# Patient Record
Sex: MALE | Race: WHITE | NOT HISPANIC OR LATINO
[De-identification: names, ages, dates, MRNs, and addresses within clinical notes are randomized per-mention and may not be internally consistent; named-entity substitution may affect disease eponyms.]

---

## 2018-12-11 ENCOUNTER — APPOINTMENT (OUTPATIENT)
Dept: FAMILY MEDICINE | Facility: CLINIC | Age: 48
End: 2018-12-11

## 2018-12-20 ENCOUNTER — APPOINTMENT (OUTPATIENT)
Dept: FAMILY MEDICINE | Facility: CLINIC | Age: 48
End: 2018-12-20
Payer: MEDICAID

## 2018-12-20 VITALS
BODY MASS INDEX: 27.32 KG/M2 | SYSTOLIC BLOOD PRESSURE: 140 MMHG | HEIGHT: 66 IN | WEIGHT: 170 LBS | DIASTOLIC BLOOD PRESSURE: 90 MMHG

## 2018-12-20 DIAGNOSIS — K21.9 GASTRO-ESOPHAGEAL REFLUX DISEASE W/OUT ESOPHAGITIS: ICD-10-CM

## 2018-12-20 DIAGNOSIS — F17.200 NICOTINE DEPENDENCE, UNSPECIFIED, UNCOMPLICATED: ICD-10-CM

## 2018-12-20 DIAGNOSIS — M16.10 UNILATERAL PRIMARY OSTEOARTHRITIS, UNSPECIFIED HIP: ICD-10-CM

## 2018-12-20 PROCEDURE — 99203 OFFICE O/P NEW LOW 30 MIN: CPT

## 2019-01-18 ENCOUNTER — RX RENEWAL (OUTPATIENT)
Age: 49
End: 2019-01-18

## 2019-02-04 ENCOUNTER — APPOINTMENT (OUTPATIENT)
Dept: FAMILY MEDICINE | Facility: CLINIC | Age: 49
End: 2019-02-04
Payer: MEDICAID

## 2019-02-04 VITALS — SYSTOLIC BLOOD PRESSURE: 146 MMHG | TEMPERATURE: 99 F | DIASTOLIC BLOOD PRESSURE: 100 MMHG

## 2019-02-04 PROCEDURE — 99214 OFFICE O/P EST MOD 30 MIN: CPT

## 2019-02-04 NOTE — REVIEW OF SYSTEMS
[Nasal Discharge] : nasal discharge [Sore Throat] : sore throat [Cough] : cough [Negative] : Cardiovascular

## 2019-02-04 NOTE — HISTORY OF PRESENT ILLNESS
[FreeTextEntry8] : c/o sore throat, chest and sinus congestion, yellow mucous\par smokes about 1/2 ppd\par c/o rt upper gum pain, dx'd w/ deviated palate, complication from previous implant

## 2019-02-04 NOTE — PHYSICAL EXAM
[No Acute Distress] : no acute distress [Well Nourished] : well nourished [Normal Sclera/Conjunctiva] : normal sclera/conjunctiva [EOMI] : extraocular movements intact [No JVD] : no jugular venous distention [Supple] : supple [No Lymphadenopathy] : no lymphadenopathy [No Respiratory Distress] : no respiratory distress  [Clear to Auscultation] : lungs were clear to auscultation bilaterally [No Accessory Muscle Use] : no accessory muscle use [Normal Rate] : normal rate  [Regular Rhythm] : with a regular rhythm [Normal S1, S2] : normal S1 and S2 [Normal Supraclavicular Nodes] : no supraclavicular lymphadenopathy [Normal Posterior Cervical Nodes] : no posterior cervical lymphadenopathy [Normal Anterior Cervical Nodes] : no anterior cervical lymphadenopathy [de-identified] : poor dentition, deviated palate, injected nasal turbinates, bulging tms [de-identified] : ambulates w/ cane

## 2019-02-04 NOTE — COUNSELING
[Good understanding] : Patient has a good understanding of lifestyle changes and the steps needed to achieve self management goals [Smoking cessation counseling provided] : smoking cessation [Low Salt Diet] : Low salt diet

## 2019-03-02 ENCOUNTER — INPATIENT (INPATIENT)
Facility: HOSPITAL | Age: 49
LOS: 5 days | Discharge: HOME | End: 2019-03-08
Attending: INTERNAL MEDICINE | Admitting: INTERNAL MEDICINE
Payer: MEDICAID

## 2019-03-02 VITALS — RESPIRATION RATE: 30 BRPM | OXYGEN SATURATION: 99 % | HEART RATE: 120 BPM

## 2019-03-02 DIAGNOSIS — I10 ESSENTIAL (PRIMARY) HYPERTENSION: ICD-10-CM

## 2019-03-02 DIAGNOSIS — R56.9 UNSPECIFIED CONVULSIONS: ICD-10-CM

## 2019-03-02 DIAGNOSIS — F10.10 ALCOHOL ABUSE, UNCOMPLICATED: ICD-10-CM

## 2019-03-02 DIAGNOSIS — S79.102A: Chronic | ICD-10-CM

## 2019-03-02 LAB
ALBUMIN SERPL ELPH-MCNC: 3.5 G/DL — SIGNIFICANT CHANGE UP (ref 3.3–5)
ALP SERPL-CCNC: 57 U/L — SIGNIFICANT CHANGE UP (ref 40–120)
ALT FLD-CCNC: 40 U/L — SIGNIFICANT CHANGE UP (ref 12–78)
AMPHET UR-MCNC: NEGATIVE — SIGNIFICANT CHANGE UP
ANION GAP SERPL CALC-SCNC: 15 MMOL/L — SIGNIFICANT CHANGE UP (ref 5–17)
APAP SERPL-MCNC: <2 UG/ML — LOW (ref 10–30)
AST SERPL-CCNC: 44 U/L — HIGH (ref 15–37)
BARBITURATES UR SCN-MCNC: NEGATIVE — SIGNIFICANT CHANGE UP
BASE EXCESS BLDA CALC-SCNC: -1 MMOL/L — SIGNIFICANT CHANGE UP (ref -2–2)
BASE EXCESS BLDA CALC-SCNC: -5.3 MMOL/L — LOW (ref -2–2)
BASOPHILS # BLD AUTO: 0.11 K/UL — SIGNIFICANT CHANGE UP (ref 0–0.2)
BASOPHILS NFR BLD AUTO: 0.7 % — SIGNIFICANT CHANGE UP (ref 0–2)
BENZODIAZ UR-MCNC: POSITIVE — SIGNIFICANT CHANGE UP
BILIRUB SERPL-MCNC: 0.2 MG/DL — SIGNIFICANT CHANGE UP (ref 0.2–1.2)
BLOOD GAS COMMENTS: SIGNIFICANT CHANGE UP
BLOOD GAS SOURCE: SIGNIFICANT CHANGE UP
BLOOD GAS SOURCE: SIGNIFICANT CHANGE UP
BUN SERPL-MCNC: 10 MG/DL — SIGNIFICANT CHANGE UP (ref 7–23)
CALCIUM SERPL-MCNC: 8.2 MG/DL — LOW (ref 8.5–10.1)
CHLORIDE SERPL-SCNC: 100 MMOL/L — SIGNIFICANT CHANGE UP (ref 96–108)
CK MB BLD-MCNC: 1.6 % — SIGNIFICANT CHANGE UP (ref 0–3.5)
CK MB CFR SERPL CALC: 4.7 NG/ML — HIGH (ref 0.5–3.6)
CK SERPL-CCNC: 299 U/L — SIGNIFICANT CHANGE UP (ref 26–308)
CO2 SERPL-SCNC: 21 MMOL/L — LOW (ref 22–31)
COCAINE METAB.OTHER UR-MCNC: NEGATIVE — SIGNIFICANT CHANGE UP
CREAT SERPL-MCNC: 0.78 MG/DL — SIGNIFICANT CHANGE UP (ref 0.5–1.3)
EOSINOPHIL # BLD AUTO: 0.12 K/UL — SIGNIFICANT CHANGE UP (ref 0–0.5)
EOSINOPHIL NFR BLD AUTO: 0.7 % — SIGNIFICANT CHANGE UP (ref 0–6)
ETHANOL SERPL-MCNC: 10 MG/DL — SIGNIFICANT CHANGE UP (ref 0–10)
GLUCOSE SERPL-MCNC: 109 MG/DL — HIGH (ref 70–99)
HCO3 BLDA-SCNC: 20 MMOL/L — LOW (ref 21–29)
HCO3 BLDA-SCNC: 24 MMOL/L — SIGNIFICANT CHANGE UP (ref 21–29)
HCT VFR BLD CALC: 42.4 % — SIGNIFICANT CHANGE UP (ref 39–50)
HGB BLD-MCNC: 13.5 G/DL — SIGNIFICANT CHANGE UP (ref 13–17)
HOROWITZ INDEX BLDA+IHG-RTO: 100 — SIGNIFICANT CHANGE UP
HOROWITZ INDEX BLDA+IHG-RTO: 50 — SIGNIFICANT CHANGE UP
IMM GRANULOCYTES NFR BLD AUTO: 1.1 % — SIGNIFICANT CHANGE UP (ref 0–1.5)
LYMPHOCYTES # BLD AUTO: 19.6 % — SIGNIFICANT CHANGE UP (ref 13–44)
LYMPHOCYTES # BLD AUTO: 3.29 K/UL — SIGNIFICANT CHANGE UP (ref 1–3.3)
MCHC RBC-ENTMCNC: 29.3 PG — SIGNIFICANT CHANGE UP (ref 27–34)
MCHC RBC-ENTMCNC: 31.8 GM/DL — LOW (ref 32–36)
MCV RBC AUTO: 92.2 FL — SIGNIFICANT CHANGE UP (ref 80–100)
METHADONE UR-MCNC: NEGATIVE — SIGNIFICANT CHANGE UP
MONOCYTES # BLD AUTO: 0.92 K/UL — HIGH (ref 0–0.9)
MONOCYTES NFR BLD AUTO: 5.5 % — SIGNIFICANT CHANGE UP (ref 2–14)
NEUTROPHILS # BLD AUTO: 12.16 K/UL — HIGH (ref 1.8–7.4)
NEUTROPHILS NFR BLD AUTO: 72.4 % — SIGNIFICANT CHANGE UP (ref 43–77)
NRBC # BLD: 0 /100 WBCS — SIGNIFICANT CHANGE UP (ref 0–0)
OPIATES UR-MCNC: POSITIVE — SIGNIFICANT CHANGE UP
PCO2 BLDA: 42 MMHG — SIGNIFICANT CHANGE UP (ref 32–46)
PCO2 BLDA: 43 MMHG — SIGNIFICANT CHANGE UP (ref 32–46)
PCP SPEC-MCNC: SIGNIFICANT CHANGE UP
PCP UR-MCNC: NEGATIVE — SIGNIFICANT CHANGE UP
PH BLD: 7.3 — LOW (ref 7.35–7.45)
PH BLD: 7.37 — SIGNIFICANT CHANGE UP (ref 7.35–7.45)
PLATELET # BLD AUTO: 392 K/UL — SIGNIFICANT CHANGE UP (ref 150–400)
PO2 BLDA: 140 MMHG — HIGH (ref 74–108)
PO2 BLDA: 502 MMHG — HIGH (ref 74–108)
POTASSIUM SERPL-MCNC: 3.5 MMOL/L — SIGNIFICANT CHANGE UP (ref 3.5–5.3)
POTASSIUM SERPL-SCNC: 3.5 MMOL/L — SIGNIFICANT CHANGE UP (ref 3.5–5.3)
PROT SERPL-MCNC: 7.4 GM/DL — SIGNIFICANT CHANGE UP (ref 6–8.3)
RBC # BLD: 4.6 M/UL — SIGNIFICANT CHANGE UP (ref 4.2–5.8)
RBC # FLD: 13.5 % — SIGNIFICANT CHANGE UP (ref 10.3–14.5)
SALICYLATES SERPL-MCNC: 3.8 MG/DL — SIGNIFICANT CHANGE UP (ref 2.8–20)
SAO2 % BLDA: 100 % — HIGH (ref 92–96)
SAO2 % BLDA: 99 % — HIGH (ref 92–96)
SODIUM SERPL-SCNC: 136 MMOL/L — SIGNIFICANT CHANGE UP (ref 135–145)
THC UR QL: NEGATIVE — SIGNIFICANT CHANGE UP
TROPONIN I SERPL-MCNC: <.015 NG/ML — SIGNIFICANT CHANGE UP (ref 0.01–0.04)
WBC # BLD: 16.78 K/UL — HIGH (ref 3.8–10.5)
WBC # FLD AUTO: 16.78 K/UL — HIGH (ref 3.8–10.5)

## 2019-03-02 PROCEDURE — 99291 CRITICAL CARE FIRST HOUR: CPT

## 2019-03-02 PROCEDURE — 71045 X-RAY EXAM CHEST 1 VIEW: CPT | Mod: 26

## 2019-03-02 PROCEDURE — 70450 CT HEAD/BRAIN W/O DYE: CPT | Mod: 26

## 2019-03-02 PROCEDURE — 71045 X-RAY EXAM CHEST 1 VIEW: CPT | Mod: 26,77

## 2019-03-02 RX ORDER — MIDAZOLAM HYDROCHLORIDE 1 MG/ML
0.05 INJECTION, SOLUTION INTRAMUSCULAR; INTRAVENOUS
Qty: 1250 | Refills: 0 | Status: DISCONTINUED | OUTPATIENT
Start: 2019-03-02 | End: 2019-03-02

## 2019-03-02 RX ORDER — NALOXONE HYDROCHLORIDE 4 MG/.1ML
0.4 SPRAY NASAL ONCE
Qty: 0 | Refills: 0 | Status: COMPLETED | OUTPATIENT
Start: 2019-03-02 | End: 2019-03-02

## 2019-03-02 RX ORDER — MIDAZOLAM HYDROCHLORIDE 1 MG/ML
2 INJECTION, SOLUTION INTRAMUSCULAR; INTRAVENOUS
Qty: 0 | Refills: 0 | Status: DISCONTINUED | OUTPATIENT
Start: 2019-03-02 | End: 2019-03-03

## 2019-03-02 RX ORDER — THIAMINE MONONITRATE (VIT B1) 100 MG
100 TABLET ORAL ONCE
Qty: 0 | Refills: 0 | Status: COMPLETED | OUTPATIENT
Start: 2019-03-02 | End: 2019-03-02

## 2019-03-02 RX ORDER — POTASSIUM CHLORIDE 20 MEQ
20 PACKET (EA) ORAL ONCE
Qty: 0 | Refills: 0 | Status: COMPLETED | OUTPATIENT
Start: 2019-03-02 | End: 2019-03-02

## 2019-03-02 RX ORDER — DEXMEDETOMIDINE HYDROCHLORIDE IN 0.9% SODIUM CHLORIDE 4 UG/ML
0.4 INJECTION INTRAVENOUS
Qty: 200 | Refills: 0 | Status: DISCONTINUED | OUTPATIENT
Start: 2019-03-02 | End: 2019-03-04

## 2019-03-02 RX ORDER — NICOTINE POLACRILEX 2 MG
1 GUM BUCCAL DAILY
Qty: 0 | Refills: 0 | Status: DISCONTINUED | OUTPATIENT
Start: 2019-03-02 | End: 2019-03-08

## 2019-03-02 RX ORDER — MIDAZOLAM HYDROCHLORIDE 1 MG/ML
4 INJECTION, SOLUTION INTRAMUSCULAR; INTRAVENOUS ONCE
Qty: 0 | Refills: 0 | Status: DISCONTINUED | OUTPATIENT
Start: 2019-03-02 | End: 2019-03-02

## 2019-03-02 RX ORDER — PHENOBARBITAL 60 MG
65 TABLET ORAL EVERY 6 HOURS
Qty: 0 | Refills: 0 | Status: DISCONTINUED | OUTPATIENT
Start: 2019-03-02 | End: 2019-03-03

## 2019-03-02 RX ORDER — PIPERACILLIN AND TAZOBACTAM 4; .5 G/20ML; G/20ML
3.38 INJECTION, POWDER, LYOPHILIZED, FOR SOLUTION INTRAVENOUS ONCE
Qty: 0 | Refills: 0 | Status: COMPLETED | OUTPATIENT
Start: 2019-03-02 | End: 2019-03-02

## 2019-03-02 RX ORDER — PHENOBARBITAL 60 MG
130 TABLET ORAL ONCE
Qty: 0 | Refills: 0 | Status: DISCONTINUED | OUTPATIENT
Start: 2019-03-02 | End: 2019-03-02

## 2019-03-02 RX ORDER — PROPOFOL 10 MG/ML
20 INJECTION, EMULSION INTRAVENOUS
Qty: 1000 | Refills: 0 | Status: DISCONTINUED | OUTPATIENT
Start: 2019-03-02 | End: 2019-03-02

## 2019-03-02 RX ORDER — LEVETIRACETAM 250 MG/1
1000 TABLET, FILM COATED ORAL ONCE
Qty: 0 | Refills: 0 | Status: COMPLETED | OUTPATIENT
Start: 2019-03-02 | End: 2019-03-02

## 2019-03-02 RX ORDER — MIDAZOLAM HYDROCHLORIDE 1 MG/ML
0.05 INJECTION, SOLUTION INTRAMUSCULAR; INTRAVENOUS
Qty: 100 | Refills: 0 | Status: DISCONTINUED | OUTPATIENT
Start: 2019-03-02 | End: 2019-03-02

## 2019-03-02 RX ORDER — SODIUM CHLORIDE 9 MG/ML
1000 INJECTION INTRAMUSCULAR; INTRAVENOUS; SUBCUTANEOUS
Qty: 0 | Refills: 0 | Status: COMPLETED | OUTPATIENT
Start: 2019-03-02 | End: 2019-03-02

## 2019-03-02 RX ORDER — PANTOPRAZOLE SODIUM 20 MG/1
40 TABLET, DELAYED RELEASE ORAL DAILY
Qty: 0 | Refills: 0 | Status: DISCONTINUED | OUTPATIENT
Start: 2019-03-02 | End: 2019-03-03

## 2019-03-02 RX ORDER — ENOXAPARIN SODIUM 100 MG/ML
40 INJECTION SUBCUTANEOUS DAILY
Qty: 0 | Refills: 0 | Status: DISCONTINUED | OUTPATIENT
Start: 2019-03-02 | End: 2019-03-08

## 2019-03-02 RX ADMIN — Medication 4 MG/KG/HR: at 07:44

## 2019-03-02 RX ADMIN — Medication 4 MILLIGRAM(S): at 05:58

## 2019-03-02 RX ADMIN — Medication 130 MILLIGRAM(S): at 20:24

## 2019-03-02 RX ADMIN — Medication 65 MILLIGRAM(S): at 22:40

## 2019-03-02 RX ADMIN — PROPOFOL 9.6 MICROGRAM(S)/KG/MIN: 10 INJECTION, EMULSION INTRAVENOUS at 05:09

## 2019-03-02 RX ADMIN — SODIUM CHLORIDE 1000 MILLILITER(S): 9 INJECTION INTRAMUSCULAR; INTRAVENOUS; SUBCUTANEOUS at 06:04

## 2019-03-02 RX ADMIN — Medication 50 MILLIGRAM(S): at 18:24

## 2019-03-02 RX ADMIN — Medication 4 MILLIGRAM(S): at 23:40

## 2019-03-02 RX ADMIN — SODIUM CHLORIDE 2000 MILLILITER(S): 9 INJECTION INTRAMUSCULAR; INTRAVENOUS; SUBCUTANEOUS at 05:42

## 2019-03-02 RX ADMIN — Medication 130 MILLIGRAM(S): at 23:15

## 2019-03-02 RX ADMIN — LEVETIRACETAM 400 MILLIGRAM(S): 250 TABLET, FILM COATED ORAL at 07:22

## 2019-03-02 RX ADMIN — SODIUM CHLORIDE 1000 MILLILITER(S): 9 INJECTION INTRAMUSCULAR; INTRAVENOUS; SUBCUTANEOUS at 06:08

## 2019-03-02 RX ADMIN — Medication 2 MILLIGRAM(S): at 04:28

## 2019-03-02 RX ADMIN — MIDAZOLAM HYDROCHLORIDE 4 MG/KG/HR: 1 INJECTION, SOLUTION INTRAMUSCULAR; INTRAVENOUS at 07:15

## 2019-03-02 RX ADMIN — SODIUM CHLORIDE 2000 MILLILITER(S): 9 INJECTION INTRAMUSCULAR; INTRAVENOUS; SUBCUTANEOUS at 04:31

## 2019-03-02 RX ADMIN — PROPOFOL 9.6 MICROGRAM(S)/KG/MIN: 10 INJECTION, EMULSION INTRAVENOUS at 12:03

## 2019-03-02 RX ADMIN — DEXMEDETOMIDINE HYDROCHLORIDE IN 0.9% SODIUM CHLORIDE 8.47 MICROGRAM(S)/KG/HR: 4 INJECTION INTRAVENOUS at 20:49

## 2019-03-02 RX ADMIN — Medication 1 PATCH: at 14:45

## 2019-03-02 RX ADMIN — Medication 20 MILLIEQUIVALENT(S): at 11:38

## 2019-03-02 RX ADMIN — Medication 100 MILLIGRAM(S): at 07:22

## 2019-03-02 RX ADMIN — Medication 1 PATCH: at 19:20

## 2019-03-02 RX ADMIN — DEXMEDETOMIDINE HYDROCHLORIDE IN 0.9% SODIUM CHLORIDE 8.47 MICROGRAM(S)/KG/HR: 4 INJECTION INTRAVENOUS at 23:02

## 2019-03-02 RX ADMIN — SODIUM CHLORIDE 2000 MILLILITER(S): 9 INJECTION INTRAMUSCULAR; INTRAVENOUS; SUBCUTANEOUS at 04:30

## 2019-03-02 RX ADMIN — SODIUM CHLORIDE 2000 MILLILITER(S): 9 INJECTION INTRAMUSCULAR; INTRAVENOUS; SUBCUTANEOUS at 06:05

## 2019-03-02 RX ADMIN — NALOXONE HYDROCHLORIDE 0.4 MILLIGRAM(S): 4 SPRAY NASAL at 04:24

## 2019-03-02 RX ADMIN — MIDAZOLAM HYDROCHLORIDE 4 MILLIGRAM(S): 1 INJECTION, SOLUTION INTRAMUSCULAR; INTRAVENOUS at 04:24

## 2019-03-02 RX ADMIN — MIDAZOLAM HYDROCHLORIDE 4 MILLIGRAM(S): 1 INJECTION, SOLUTION INTRAMUSCULAR; INTRAVENOUS at 04:35

## 2019-03-02 RX ADMIN — PIPERACILLIN AND TAZOBACTAM 200 GRAM(S): 4; .5 INJECTION, POWDER, LYOPHILIZED, FOR SOLUTION INTRAVENOUS at 08:01

## 2019-03-02 RX ADMIN — Medication 50 MILLIGRAM(S): at 11:38

## 2019-03-02 RX ADMIN — PANTOPRAZOLE SODIUM 40 MILLIGRAM(S): 20 TABLET, DELAYED RELEASE ORAL at 11:38

## 2019-03-02 RX ADMIN — ENOXAPARIN SODIUM 40 MILLIGRAM(S): 100 INJECTION SUBCUTANEOUS at 11:37

## 2019-03-02 NOTE — H&P ADULT - ASSESSMENT
49 yo male, h/o of HTN, hyperlipidemia, ETOH abuse, heroin use, found by mom unresponsive and seizing.  no prior history of seizures, unknown when last drink was.  Had 4-5 seizures in ED despite being on propofol and versed drip.  Currently no active seizure activity.

## 2019-03-02 NOTE — ED PROVIDER NOTE - PHYSICAL EXAMINATION
Gen: combative, sonorous respirations  Head: NC, AT, 4mm, reactive, EOMI, normal lids/conjunctiva  Neck: +supple, no tenderness/meningismus/JVD, +Trachea midline  Pulm: Bilateral BS, normal resp effort, no wheeze/stridor/retractions  CV: tachycardic, no M/R/G, +dist pulses  Abd: soft, NT/ND, +BS, no hepatosplenomegaly  Mskel: no edema/erythema/cyanosis  Neuro: combative, zapata x4

## 2019-03-02 NOTE — ED ADULT NURSE REASSESSMENT NOTE - NS ED NURSE REASSESS COMMENT FT1
Patient with Tonic-clonic seizure lasting about 20 seconds: started in legs and moved upward. + jaw clenching and flailing of arms and legs. Dr. Rausch to Bedside: Ativan 4mg given IVP emergently. Patient with Tonic-clonic seizure lasting about 20 seconds: started in legs and moved upward. + jaw clenching and flailing of arms and legs. Dr. Rausch to Bedside: Ativan 4mg given IVP emergently. Versed drip increased at bedside by Dr. Rausch.

## 2019-03-02 NOTE — ED PROVIDER NOTE - CLINICAL SUMMARY MEDICAL DECISION MAKING FREE TEXT BOX
patient with chronic alcohol and heroin abuse, no alcohol in system, found to have alcohol withdrawal seizures, required intubation for airway protection, controlled with IV propofol and IV ativan, admitted to ICU for further treatment.

## 2019-03-02 NOTE — H&P ADULT - ATTENDING COMMENTS
Pt seen and examined on rounds with ICU team 3/2.    48M PMH alcohol abuse and drug abuse (heroin) presents after being found unresponsive by mother seizing (tonic clonic) with bag of heroin next to patient. Mother reports pt drinks a pack of 24 beers daily. She states that recently they have been aruging over his drinking and drug abuse. Mother states pt has been "running low on money" and likely was not able to afford buying as much alcohol as he usually drinks. EMS gave narcan after which pt had another seizure. Combative in ED requiring multiple versed pushes, intubated for airway protection. admitted to ICU for seizures, respiratory failure intubated, likely alcohol withdrawal seizures.     1. NEURO  - pt likely having alcohol withdrawal seizures  - currently not actively seizing, arousable on light sedation and following simple commands  - no further seizures noted since admission  - cont with precedex to help with withdrawal as well as with sedation  - start librium for alcohol withdrawal, phenobarbital prn breakthrough agitation  - MVI. thiamine. folate    2. PULM  - weaned and extubated this afternoon  - doing well on nasal cannula    3. ID  - mild leukocytosis can be in setting of acute etoh withdrawal vs recent leukemoid reaction with seizures  - monitor off antibiotics  - monitor for fevers  - trend WBC if continues to increase check blood and urine cx    4. GEN  - supplement electrolytes as needed    Critical Care Time: 60 min

## 2019-03-02 NOTE — ED ADULT NURSE NOTE - OBJECTIVE STATEMENT
pt presents by EMS as combative s/p seizure. Pt is known heroin and alcohol abuser as per family, recently earned some money and they believe he used today. EMS had placed a 20 IV in the left hand, and gave 2mg of narcan, then pt had a seizure and was given versed. Pt is tachycardic to the 150s and hypertensive. second and third IV accesses gained in the right fa, 18g and 20 g, labs drawn and sent. Pt unable to be sedated adequately with additional meds. physician ordered etomidate and succs for intubation, which were given w adequate sedation. a total of 130 mg of propofol given as IVP for breakthrough bucking (50mg, 30 mg, 50mg.). propofol drip administered at 4mg and titrated up by Dr. Rausch. 16 fr wynn placed UA and C&S sent. Pt continuously monitored 1:1 nursing at bedside. Pt had one seizure as noted and sustained a small lac to the tip of his tongue. Pt seizure stopped and additional meds were given as per EMAR. pt brought to CT w two nurses and RT. returned to bed 4 with continous monitoring.

## 2019-03-02 NOTE — ED ADULT TRIAGE NOTE - CHIEF COMPLAINT QUOTE
As per EMS pt unresponsive, family states pt snorted heroin and had a seizure in front of them. EMS states on arrival to scene, pt found sitting up, eyes open, responds to pain. States 2mg Narcan IVP and 5mg Versed IVP given.

## 2019-03-02 NOTE — H&P ADULT - PROBLEM SELECTOR PLAN 1
load with Keppra 1 gram  continue propolol and ativan  eeg  neurology consult  thiamine, folate, MVI  tox screen

## 2019-03-02 NOTE — H&P ADULT - PMH
Essential hypertension    ETOH abuse    Heroin abuse    Pure hypercholesterolemia    Substance abuse

## 2019-03-02 NOTE — H&P ADULT - HISTORY OF PRESENT ILLNESS
HPI: · HPI Objective Statement: 48yoM; with pmh signif for Alcohol Abuse, Heroin Abuse, HTN, HLD; now p/w ams. patient found unresponsive by mother on bathroom ground next to back of heroin and then patient began having seizure. upon ems arrival, patient with snoring respirations, given narcan 2mg and began having seizure again, given versed 5mg IV.  then became combative. patient arrived persistently combative despite repeat versed doses with sonorous respirations.  intubated for airway protection.  	    Mom says patient suffers with ETOH abuse, and feels he used heroin this evening because he couldn't afford beer.  Unknown when last drink was .    In ED patient seized several more times (4-5), thru propofol and versed drip.  Currently being transitioned to ativan drip

## 2019-03-02 NOTE — ED PROVIDER NOTE - OBJECTIVE STATEMENT
48yoM; with pmh signif for Alcohol Abuse, Heroin Abuse, HTN, HLD; now p/w ams. patient found unresponsive by mother on bathroom ground next to back of heroin and then patient began having seizure. upon ems arrival, patient with snorous respirations, given narcan 2mg and began having seizure again, given versed 5mg IV.  then became combative. patient arrived persistently combative despite repeat versed doses with sonorous respirations.  intubated for airway protection.  PMH: Alcohol Abuse, Heroin Abuse, HTN, HLD  SOCIAL: +smoking, +heroin, +alcohol

## 2019-03-02 NOTE — H&P ADULT - NSHPLABSRESULTS_GEN_ALL_CORE
< from: CT Head No Cont (03.02.19 @ 06:39) >    INTERPRETATION:    Clinical Indication: Seizure. Rule out intracranial hemorrhage    Comparison: None.    Technique: Noncontrast axial CT images of the head it were acquired.    Findings: No intracranial hemorrhage is seen. Gray-white differentiation   is maintained. Ventricular and sulcal size are age-appropriate. No mass   effect or midline shift is seen. There is mucosal thickening of both   maxillary, ethmoid, sphenoid and left frontal sinuses. Small retention   cysts or polyps are seen in the right maxillary and ethmoid sinuses. The   visualized mastoid air cells are clear. No osseous abnormality is seen.    Impression: No intracranial abnormality.  Pansinusitis.

## 2019-03-02 NOTE — H&P ADULT - NSHPPHYSICALEXAM_GEN_ALL_CORE
patient intubated and sedated    pupils pinpoint but reactive  + corneals  + positive brainstem reflexes  moves all extremities purposefully to noxious stimuli    on mechanical ventilation:  l/s CTA b/l  CV: s1S2 RRR  abd soft  extemities + track marks

## 2019-03-02 NOTE — ED ADULT NURSE NOTE - ED STAT RN HANDOFF DETAILS
pt received intubated, propofol drip in progress, ativan drip started, vs stable, wynn intact, no seizure activity noted. report given to MELY cintron. ok to transport with 2 RNs and respiratory

## 2019-03-02 NOTE — H&P ADULT - NSHPSOCIALHISTORY_GEN_ALL_CORE
long history of ETOH use, although mom thought he was quitting because he got a new job.  Also uses heroin "when he can't get alcohol"

## 2019-03-03 LAB
ANION GAP SERPL CALC-SCNC: 8 MMOL/L — SIGNIFICANT CHANGE UP (ref 5–17)
BUN SERPL-MCNC: 4 MG/DL — LOW (ref 7–23)
CALCIUM SERPL-MCNC: 8.3 MG/DL — LOW (ref 8.5–10.1)
CHLORIDE SERPL-SCNC: 107 MMOL/L — SIGNIFICANT CHANGE UP (ref 96–108)
CO2 SERPL-SCNC: 24 MMOL/L — SIGNIFICANT CHANGE UP (ref 22–31)
CREAT SERPL-MCNC: 0.63 MG/DL — SIGNIFICANT CHANGE UP (ref 0.5–1.3)
GLUCOSE SERPL-MCNC: 111 MG/DL — HIGH (ref 70–99)
HCT VFR BLD CALC: 40.4 % — SIGNIFICANT CHANGE UP (ref 39–50)
HGB BLD-MCNC: 13.5 G/DL — SIGNIFICANT CHANGE UP (ref 13–17)
MAGNESIUM SERPL-MCNC: 2.2 MG/DL — SIGNIFICANT CHANGE UP (ref 1.6–2.6)
MCHC RBC-ENTMCNC: 30.1 PG — SIGNIFICANT CHANGE UP (ref 27–34)
MCHC RBC-ENTMCNC: 33.4 GM/DL — SIGNIFICANT CHANGE UP (ref 32–36)
MCV RBC AUTO: 90 FL — SIGNIFICANT CHANGE UP (ref 80–100)
NRBC # BLD: 0 /100 WBCS — SIGNIFICANT CHANGE UP (ref 0–0)
PHOSPHATE SERPL-MCNC: 2.6 MG/DL — SIGNIFICANT CHANGE UP (ref 2.5–4.5)
PLATELET # BLD AUTO: 351 K/UL — SIGNIFICANT CHANGE UP (ref 150–400)
POTASSIUM SERPL-MCNC: 3.6 MMOL/L — SIGNIFICANT CHANGE UP (ref 3.5–5.3)
POTASSIUM SERPL-SCNC: 3.6 MMOL/L — SIGNIFICANT CHANGE UP (ref 3.5–5.3)
RBC # BLD: 4.49 M/UL — SIGNIFICANT CHANGE UP (ref 4.2–5.8)
RBC # FLD: 13.6 % — SIGNIFICANT CHANGE UP (ref 10.3–14.5)
SODIUM SERPL-SCNC: 139 MMOL/L — SIGNIFICANT CHANGE UP (ref 135–145)
WBC # BLD: 15.81 K/UL — HIGH (ref 3.8–10.5)
WBC # FLD AUTO: 15.81 K/UL — HIGH (ref 3.8–10.5)

## 2019-03-03 PROCEDURE — 99233 SBSQ HOSP IP/OBS HIGH 50: CPT

## 2019-03-03 RX ORDER — PHENOBARBITAL 60 MG
130 TABLET ORAL ONCE
Qty: 0 | Refills: 0 | Status: DISCONTINUED | OUTPATIENT
Start: 2019-03-03 | End: 2019-03-03

## 2019-03-03 RX ORDER — HYDRALAZINE HCL 50 MG
10 TABLET ORAL ONCE
Qty: 0 | Refills: 0 | Status: COMPLETED | OUTPATIENT
Start: 2019-03-03 | End: 2019-03-03

## 2019-03-03 RX ORDER — OLANZAPINE 15 MG/1
5 TABLET, FILM COATED ORAL ONCE
Qty: 0 | Refills: 0 | Status: COMPLETED | OUTPATIENT
Start: 2019-03-03 | End: 2019-03-03

## 2019-03-03 RX ORDER — ACETAMINOPHEN 500 MG
650 TABLET ORAL EVERY 6 HOURS
Qty: 0 | Refills: 0 | Status: DISCONTINUED | OUTPATIENT
Start: 2019-03-03 | End: 2019-03-06

## 2019-03-03 RX ORDER — PHENOBARBITAL 60 MG
260 TABLET ORAL
Qty: 0 | Refills: 0 | Status: DISCONTINUED | OUTPATIENT
Start: 2019-03-03 | End: 2019-03-05

## 2019-03-03 RX ORDER — PHENOBARBITAL 60 MG
65 TABLET ORAL EVERY 6 HOURS
Qty: 0 | Refills: 0 | Status: DISCONTINUED | OUTPATIENT
Start: 2019-03-03 | End: 2019-03-03

## 2019-03-03 RX ORDER — PHENOBARBITAL 60 MG
130 TABLET ORAL
Qty: 0 | Refills: 0 | Status: DISCONTINUED | OUTPATIENT
Start: 2019-03-03 | End: 2019-03-05

## 2019-03-03 RX ORDER — PHENOBARBITAL 60 MG
130 TABLET ORAL EVERY 6 HOURS
Qty: 0 | Refills: 0 | Status: DISCONTINUED | OUTPATIENT
Start: 2019-03-03 | End: 2019-03-05

## 2019-03-03 RX ORDER — PROPOFOL 10 MG/ML
30 INJECTION, EMULSION INTRAVENOUS ONCE
Qty: 0 | Refills: 0 | Status: COMPLETED | OUTPATIENT
Start: 2019-03-03 | End: 2019-03-03

## 2019-03-03 RX ORDER — SODIUM CHLORIDE 9 MG/ML
1000 INJECTION, SOLUTION INTRAVENOUS
Qty: 0 | Refills: 0 | Status: COMPLETED | OUTPATIENT
Start: 2019-03-03 | End: 2019-03-03

## 2019-03-03 RX ORDER — DIPHENHYDRAMINE HCL 50 MG
50 CAPSULE ORAL ONCE
Qty: 0 | Refills: 0 | Status: COMPLETED | OUTPATIENT
Start: 2019-03-03 | End: 2019-03-03

## 2019-03-03 RX ADMIN — DEXMEDETOMIDINE HYDROCHLORIDE IN 0.9% SODIUM CHLORIDE 8.47 MICROGRAM(S)/KG/HR: 4 INJECTION INTRAVENOUS at 07:35

## 2019-03-03 RX ADMIN — Medication 10 MILLIGRAM(S): at 08:55

## 2019-03-03 RX ADMIN — Medication 130 MILLIGRAM(S): at 18:28

## 2019-03-03 RX ADMIN — Medication 50 MILLIGRAM(S): at 00:39

## 2019-03-03 RX ADMIN — Medication 1 PATCH: at 19:30

## 2019-03-03 RX ADMIN — Medication 1 PATCH: at 12:30

## 2019-03-03 RX ADMIN — Medication 1 PATCH: at 07:41

## 2019-03-03 RX ADMIN — DEXMEDETOMIDINE HYDROCHLORIDE IN 0.9% SODIUM CHLORIDE 8.47 MICROGRAM(S)/KG/HR: 4 INJECTION INTRAVENOUS at 09:13

## 2019-03-03 RX ADMIN — Medication 130 MILLIGRAM(S): at 03:03

## 2019-03-03 RX ADMIN — DEXMEDETOMIDINE HYDROCHLORIDE IN 0.9% SODIUM CHLORIDE 8.47 MICROGRAM(S)/KG/HR: 4 INJECTION INTRAVENOUS at 14:30

## 2019-03-03 RX ADMIN — Medication 130 MILLIGRAM(S): at 12:56

## 2019-03-03 RX ADMIN — Medication 130 MILLIGRAM(S): at 23:41

## 2019-03-03 RX ADMIN — SODIUM CHLORIDE 75 MILLILITER(S): 9 INJECTION, SOLUTION INTRAVENOUS at 12:51

## 2019-03-03 RX ADMIN — OLANZAPINE 5 MILLIGRAM(S): 15 TABLET, FILM COATED ORAL at 04:08

## 2019-03-03 RX ADMIN — Medication 1 PATCH: at 14:30

## 2019-03-03 RX ADMIN — DEXMEDETOMIDINE HYDROCHLORIDE IN 0.9% SODIUM CHLORIDE 8.47 MICROGRAM(S)/KG/HR: 4 INJECTION INTRAVENOUS at 00:58

## 2019-03-03 RX ADMIN — PROPOFOL 30 MILLIGRAM(S): 10 INJECTION, EMULSION INTRAVENOUS at 01:13

## 2019-03-03 RX ADMIN — DEXMEDETOMIDINE HYDROCHLORIDE IN 0.9% SODIUM CHLORIDE 8.47 MICROGRAM(S)/KG/HR: 4 INJECTION INTRAVENOUS at 23:46

## 2019-03-03 RX ADMIN — DEXMEDETOMIDINE HYDROCHLORIDE IN 0.9% SODIUM CHLORIDE 8.47 MICROGRAM(S)/KG/HR: 4 INJECTION INTRAVENOUS at 03:13

## 2019-03-03 RX ADMIN — Medication 10 MILLIGRAM(S): at 04:35

## 2019-03-03 RX ADMIN — ENOXAPARIN SODIUM 40 MILLIGRAM(S): 100 INJECTION SUBCUTANEOUS at 12:56

## 2019-03-03 NOTE — PROGRESS NOTE ADULT - SUBJECTIVE AND OBJECTIVE BOX
HPI:  · HPI Objective Statement: 48yoM; with pmh signif for Alcohol Abuse, Heroin Abuse, HTN, HLD; now p/w ams. patient found unresponsive by mother on bathroom ground next to back of heroin and then patient began having seizure. upon ems arrival, patient with snoring respirations, given narcan 2mg and began having seizure again, given versed 5mg IV.  then became combative. patient arrived persistently combative despite repeat versed doses with sonorous respirations.  intubated for airway protection.  	    Mom says patient suffers with ETOH abuse, and feels he used heroin this evening because he couldn't afford beer.  Unknown when last drink was .    In ED patient seized several more times (4-5), thru propofol and versed drip.  Currently being transitioned to ativan drip (02 Mar 2019 07:00)      24 hr events:      ## ROS:  [ ] unable to obtain  CONSTITUTIONAL: No fever, weight loss, or fatigue  EYES: No eye pain, visual disturbances, or discharge  ENMT:  No difficulty hearing, tinnitus, vertigo; No sinus or throat pain  NECK: No pain or stiffness  RESPIRATORY: No cough, wheezing, chills or hemoptysis; No shortness of breath  CARDIOVASCULAR: No chest pain, palpitations, dizziness, or leg swelling  GASTROINTESTINAL: No abdominal or epigastric pain. No nausea, vomiting, or hematemesis; No diarrhea or constipation. No melena or hematochezia.  GENITOURINARY: No dysuria, frequency, hematuria, or incontinence  NEUROLOGICAL: No headaches, memory loss, loss of strength, numbness, or tremors  SKIN: No itching, burning, rashes, or lesions   LYMPH NODES: No enlarged glands  ENDOCRINE: No heat or cold intolerance; No hair loss  MUSCULOSKELETAL: No joint pain or swelling; No muscle, back, or extremity pain  PSYCHIATRIC: No depression, anxiety, mood swings, or difficulty sleeping  HEME/LYMPH: No easy bruising, or bleeding gums  ALLERGY AND IMMUNOLOGIC: No hives or eczema    ## Labs:  CBC:                        13.5   15.81 )-----------( 351      ( 03 Mar 2019 05:31 )             40.4     Chem:  03-03    139  |  107  |  4<L>  ----------------------------<  111<H>  3.6   |  24  |  0.63    Ca    8.3<L>      03 Mar 2019 05:31  Phos  2.6     03-03  Mg     2.2     03-03    TPro  7.4  /  Alb  3.5  /  TBili  0.2  /  DBili  x   /  AST  44<H>  /  ALT  40  /  AlkPhos  57  03-02    Coags:          ## Imaging:    ## Medications:          enoxaparin Injectable 40 milliGRAM(s) SubCutaneous daily      acetaminophen  Suppository .. 650 milliGRAM(s) Rectal every 6 hours PRN  dexmedetomidine Infusion 0.4 MICROgram(s)/kG/Hr IV Continuous <Continuous>  PHENobarbital Injectable 130 milliGRAM(s) IV Push every 6 hours  PHENobarbital Injectable 130 milliGRAM(s) IV Push every 15 minutes PRN  PHENobarbital Injectable 260 milliGRAM(s) IV Push every 15 minutes PRN      ## Vitals:  T(C): 37.6 (03-03-19 @ 23:00), Max: 38.2 (03-03-19 @ 04:45)  HR: 92 (03-03-19 @ 22:30) (57 - 100)  BP: 154/91 (03-03-19 @ 22:30) (119/58 - 206/126)  BP(mean): 108 (03-03-19 @ 22:30) (73 - 180)  RR: 25 (03-03-19 @ 22:30) (17 - 45)  SpO2: 100% (03-03-19 @ 22:30) (93% - 100%)  Wt(kg): --  Vent:   ABG: ABG - ( 02 Mar 2019 07:04 )  pH, Arterial: x     pH, Blood: 7.37  /  pCO2: 42    /  pO2: 140   / HCO3: 24    / Base Excess: -1.0  /  SaO2: 99                    03-02 @ 07:01  -  03-03 @ 07:00  --------------------------------------------------------  IN: 543.7 mL / OUT: 5850 mL / NET: -5306.3 mL    03-03 @ 07:01  -  03-03 @ 23:28  --------------------------------------------------------  IN: 526.9 mL / OUT: 2075 mL / NET: -1548.1 mL          ## P/E:  Gen: lying comfortably in bed in no apparent distress  HEENT: PERRL, EOMI  Resp: CTA B/L no c/r/w  CVS: S1S2 no m/r/g  Abd: soft NT/ND +BS  Ext: no c/c/e  Neuro: A&Ox3    CENTRAL LINE: [ ] YES [ ] NO  LOCATION:   DATE INSERTED:  REMOVE: [ ] YES [ ] NO      LIBRADO: [ ] YES [ ] NO    DATE INSERTED:  REMOVE:  [ ] YES [ ] NO      A-LINE:  [ ] YES [ ] NO  LOCATION:   DATE INSERTED:  REMOVE:  [ ] YES [ ] NO  EXPLAIN:    GLOBAL ISSUE/BEST PRACTICE:  Analgesia:  Sedation:  HOB elevation: yes  Stress ulcer prophylaxis:  VTE prophylaxis:  Oral Care:  Glycemic control:  Nutrition:    CODE STATUS: [ ] full code  [ ] DNR  [ ] DNI  [ ] [facility]  Goals of care discussion: [ ] yes HPI:  48M PMH Alcohol Abuse, Heroin Abuse, HTN, HLD presents for AMS. Pt found by mother unresponsive seizing. She noted pt had a bag of heroin in his possession. EMS administered narcan with subsequent witnessed seizure by EMS. In ED pt agitated and combative. Given versed for sedation, intubated for respiratory failure, airway protection. Noted to seize in ED after intubation on propofol and versed gtt.        24 hr events:  weaned and extubated yesterday 3/2  combative overnight  remains on precedex  received haldol and phenobarbital overnight due to severe agitation  pt unable to take oral intake due to delirium and encephalopathy: refusing librium po intake at times    ## ROS:  [x] unable to obtain due to delirium and sedation      ## Labs:  CBC:                        13.5   15.81 )-----------( 351      ( 03 Mar 2019 05:31 )             40.4     Chem:  03-03    139  |  107  |  4<L>  ----------------------------<  111<H>  3.6   |  24  |  0.63    Ca    8.3<L>      03 Mar 2019 05:31  Phos  2.6     03-03  Mg     2.2     03-03    TPro  7.4  /  Alb  3.5  /  TBili  0.2  /  DBili  x   /  AST  44<H>  /  ALT  40  /  AlkPhos  57  03-02    Alcohol, Blood (03.02.19 @ 04:35)    Alcohol, Blood: 10 mg/dL      Opiate, Urine (03.02.19 @ 06:34)    Opiate, Urine: Positive        ## Imaging:  CXR < from: Xray Chest 1 View- PORTABLE-Urgent (03.02.19 @ 11:43) >   Lungs appear clear. No effusion or sizable pneumothorax. Nasogastric tube has been introduced tip not imaged but below diaphragm in the left quadrant .    CT head < from: CT Head No Cont (03.02.19 @ 06:39) >  No intracranial abnormality.       ## Medications:  enoxaparin Injectable 40 milliGRAM(s) SubCutaneous daily      acetaminophen  Suppository .. 650 milliGRAM(s) Rectal every 6 hours PRN  dexmedetomidine Infusion 0.4 MICROgram(s)/kG/Hr IV Continuous <Continuous>  PHENobarbital Injectable 130 milliGRAM(s) IV Push every 6 hours  PHENobarbital Injectable 130 milliGRAM(s) IV Push every 15 minutes PRN  PHENobarbital Injectable 260 milliGRAM(s) IV Push every 15 minutes PRN      ## Vitals:  T(C): 37.6 (03-03-19 @ 23:00), Max: 38.2 (03-03-19 @ 04:45)  HR: 92 (03-03-19 @ 22:30) (57 - 100)  BP: 154/91 (03-03-19 @ 22:30) (119/58 - 206/126)  BP(mean): 108 (03-03-19 @ 22:30) (73 - 180)  RR: 25 (03-03-19 @ 22:30) (17 - 45)  SpO2: 100% (03-03-19 @ 22:30) (93% - 100%)  ABG: ABG - ( 02 Mar 2019 07:04 )  pH, Arterial:  pH, Blood: 7.37  /  pCO2: 42    /  pO2: 140   / HCO3: 24    / Base Excess: -1.0  /  SaO2: 99                    03-02 @ 07:01  -  03-03 @ 07:00  --------------------------------------------------------  IN: 543.7 mL / OUT: 5850 mL / NET: -5306.3 mL    03-03 @ 07:01  -  03-03 @ 23:28  --------------------------------------------------------  IN: 526.9 mL / OUT: 2075 mL / NET: -1548.1 mL          ## P/E:  Gen: periods of agitation and restlessness  HEENT: PERRL, EOMI  Resp: CTA B/L no wheeze, no rhonchi  CVS: S1S2 RRR  Abd: soft NT/ND +BS  Ext: no c/c/e  Neuro: arousable to follow simple commands, remains encephalopathic, confused and disoriented    CENTRAL LINE: [ ] YES [x] NO  LOCATION:   DATE INSERTED:  REMOVE: [ ] YES [ ] NO      PAVON: [ ] YES [x] NO    DATE INSERTED:  REMOVE:  [ ] YES [ ] NO      A-LINE:  [ ] YES [x] NO  LOCATION:   DATE INSERTED:  REMOVE:  [ ] YES [ ] NO  EXPLAIN:    GLOBAL ISSUE/BEST PRACTICE:  Analgesia: n/a  Sedation: precedex  HOB elevation: yes  Stress ulcer prophylaxis: n/a  VTE prophylaxis: lovenox  Oral Care:  n/a  Glycemic control: n/a  Nutrition: DASH diet    CODE STATUS: [x] full code  [ ] DNR  [ ] DNI  [ ] [facility]  Goals of care discussion: [ ] yes

## 2019-03-04 LAB
ANION GAP SERPL CALC-SCNC: 8 MMOL/L — SIGNIFICANT CHANGE UP (ref 5–17)
BUN SERPL-MCNC: 5 MG/DL — LOW (ref 7–23)
CALCIUM SERPL-MCNC: 8.2 MG/DL — LOW (ref 8.5–10.1)
CHLORIDE SERPL-SCNC: 106 MMOL/L — SIGNIFICANT CHANGE UP (ref 96–108)
CO2 SERPL-SCNC: 25 MMOL/L — SIGNIFICANT CHANGE UP (ref 22–31)
CREAT SERPL-MCNC: 0.54 MG/DL — SIGNIFICANT CHANGE UP (ref 0.5–1.3)
GLUCOSE SERPL-MCNC: 97 MG/DL — SIGNIFICANT CHANGE UP (ref 70–99)
HCT VFR BLD CALC: 39.5 % — SIGNIFICANT CHANGE UP (ref 39–50)
HGB BLD-MCNC: 13.3 G/DL — SIGNIFICANT CHANGE UP (ref 13–17)
MAGNESIUM SERPL-MCNC: 2.3 MG/DL — SIGNIFICANT CHANGE UP (ref 1.6–2.6)
MCHC RBC-ENTMCNC: 30.2 PG — SIGNIFICANT CHANGE UP (ref 27–34)
MCHC RBC-ENTMCNC: 33.7 GM/DL — SIGNIFICANT CHANGE UP (ref 32–36)
MCV RBC AUTO: 89.8 FL — SIGNIFICANT CHANGE UP (ref 80–100)
NRBC # BLD: 0 /100 WBCS — SIGNIFICANT CHANGE UP (ref 0–0)
PHOSPHATE SERPL-MCNC: 2.4 MG/DL — LOW (ref 2.5–4.5)
PLATELET # BLD AUTO: 347 K/UL — SIGNIFICANT CHANGE UP (ref 150–400)
POTASSIUM SERPL-MCNC: 3.6 MMOL/L — SIGNIFICANT CHANGE UP (ref 3.5–5.3)
POTASSIUM SERPL-SCNC: 3.6 MMOL/L — SIGNIFICANT CHANGE UP (ref 3.5–5.3)
PROCALCITONIN SERPL-MCNC: 0.02 NG/ML — SIGNIFICANT CHANGE UP (ref 0.02–0.1)
RBC # BLD: 4.4 M/UL — SIGNIFICANT CHANGE UP (ref 4.2–5.8)
RBC # FLD: 13.3 % — SIGNIFICANT CHANGE UP (ref 10.3–14.5)
SODIUM SERPL-SCNC: 139 MMOL/L — SIGNIFICANT CHANGE UP (ref 135–145)
WBC # BLD: 13.37 K/UL — HIGH (ref 3.8–10.5)
WBC # FLD AUTO: 13.37 K/UL — HIGH (ref 3.8–10.5)

## 2019-03-04 PROCEDURE — 99233 SBSQ HOSP IP/OBS HIGH 50: CPT

## 2019-03-04 RX ORDER — SIMETHICONE 80 MG/1
80 TABLET, CHEWABLE ORAL EVERY 8 HOURS
Qty: 0 | Refills: 0 | Status: DISCONTINUED | OUTPATIENT
Start: 2019-03-04 | End: 2019-03-08

## 2019-03-04 RX ORDER — POTASSIUM PHOSPHATE, MONOBASIC POTASSIUM PHOSPHATE, DIBASIC 236; 224 MG/ML; MG/ML
15 INJECTION, SOLUTION INTRAVENOUS ONCE
Qty: 0 | Refills: 0 | Status: COMPLETED | OUTPATIENT
Start: 2019-03-04 | End: 2019-03-04

## 2019-03-04 RX ADMIN — DEXMEDETOMIDINE HYDROCHLORIDE IN 0.9% SODIUM CHLORIDE 8.47 MICROGRAM(S)/KG/HR: 4 INJECTION INTRAVENOUS at 05:25

## 2019-03-04 RX ADMIN — Medication 1 PATCH: at 13:04

## 2019-03-04 RX ADMIN — Medication 1 PATCH: at 07:00

## 2019-03-04 RX ADMIN — ENOXAPARIN SODIUM 40 MILLIGRAM(S): 100 INJECTION SUBCUTANEOUS at 12:40

## 2019-03-04 RX ADMIN — POTASSIUM PHOSPHATE, MONOBASIC POTASSIUM PHOSPHATE, DIBASIC 63.75 MILLIMOLE(S): 236; 224 INJECTION, SOLUTION INTRAVENOUS at 06:58

## 2019-03-04 RX ADMIN — Medication 130 MILLIGRAM(S): at 05:25

## 2019-03-04 RX ADMIN — Medication 130 MILLIGRAM(S): at 18:49

## 2019-03-04 RX ADMIN — Medication 1 PATCH: at 20:52

## 2019-03-04 RX ADMIN — SIMETHICONE 80 MILLIGRAM(S): 80 TABLET, CHEWABLE ORAL at 17:59

## 2019-03-04 RX ADMIN — Medication 1 PATCH: at 12:36

## 2019-03-04 RX ADMIN — Medication 130 MILLIGRAM(S): at 12:40

## 2019-03-04 NOTE — PROGRESS NOTE ADULT - SUBJECTIVE AND OBJECTIVE BOX
HPI:  · HPI Objective Statement: 48yoM; with pmh signif for Alcohol Abuse, Heroin Abuse, HTN, HLD; now p/w ams. patient found unresponsive by mother on bathroom ground next to back of heroin and then patient began having seizure. upon ems arrival, patient with snoring respirations, given narcan 2mg and began having seizure again, given versed 5mg IV.  then became combative. patient arrived persistently combative despite repeat versed doses with sonorous respirations.  intubated for airway protection.  	    Mom says patient suffers with ETOH abuse, and feels he used heroin this evening because he couldn't afford beer.  Unknown when last drink was .    In ED patient seized several more times (4-5), thru propofol and versed drip.  Currently being transitioned to ativan drip (02 Mar 2019 07:00)      24 hr events: No acute events. Dose of phenobarb increased. Precedex turned off this morning.     ## ROS:  See above. ROS otherwise negative.    ## Vitals  ICU Vital Signs Last 24 Hrs  T(C): 37.2 (04 Mar 2019 07:13), Max: 37.6 (03 Mar 2019 23:00)  T(F): 99 (04 Mar 2019 07:13), Max: 99.7 (03 Mar 2019 23:00)  HR: 94 (04 Mar 2019 09:00) (74 - 100)  BP: 149/125 (04 Mar 2019 08:36) (119/58 - 179/100)  BP(mean): 130 (04 Mar 2019 08:36) (73 - 130)  ABP: --  ABP(mean): --  RR: 14 (04 Mar 2019 09:00) (14 - 45)  SpO2: 96% (04 Mar 2019 09:00) (95% - 100%)      ## Physical Exam:  Gen: Adult male lying in bed, NAD  HEENT: NC/AT sclerae anicteric  Resp: No increased WOB, CTAB  CV: S1, S2  Abd: Soft, + BS  Ext: No edema  Neuro: Awake, alert, follows commands, responds to questions, moves all extremities  Psych: No insight into current medical condition    ## Vent Data      ## Labs:  Chem:  03-04    139  |  106  |  5<L>  ----------------------------<  97  3.6   |  25  |  0.54    Ca    8.2<L>      04 Mar 2019 04:26  Phos  2.4     03-04  Mg     2.3     03-04        CBC:                        13.3   13.37 )-----------( 347      ( 04 Mar 2019 04:26 )             39.5     Coags:          ## Cardiac        ## Blood Gas      #I/Os  I&O's Detail    03 Mar 2019 07:01  -  04 Mar 2019 07:00  --------------------------------------------------------  IN:    dexmedetomidine Infusion: 156.1 mL    multivitamin/thiamine/folic acid in sodium chloride 0.9%: 375 mL    Oral Fluid: 535 mL  Total IN: 1066.1 mL    OUT:    Indwelling Catheter - Urethral: 2950 mL  Total OUT: 2950 mL    Total NET: -1883.9 mL      04 Mar 2019 07:01  -  04 Mar 2019 09:43  --------------------------------------------------------  IN:  Total IN: 0 mL    OUT:    Indwelling Catheter - Urethral: 100 mL  Total OUT: 100 mL    Total NET: -100 mL          ## Imaging:    ## Medications:  MEDICATIONS  (STANDING):  dexmedetomidine Infusion 0.4 MICROgram(s)/kG/Hr (8.47 mL/Hr) IV Continuous <Continuous>  enoxaparin Injectable 40 milliGRAM(s) SubCutaneous daily  nicotine - 21 mG/24Hr(s) Patch 1 patch Transdermal daily  PHENobarbital Injectable 130 milliGRAM(s) IV Push every 6 hours    MEDICATIONS  (PRN):  acetaminophen  Suppository .. 650 milliGRAM(s) Rectal every 6 hours PRN Temp greater or equal to 38C (100.4F)  PHENobarbital Injectable 130 milliGRAM(s) IV Push every 15 minutes PRN ciwa >8  PHENobarbital Injectable 260 milliGRAM(s) IV Push every 15 minutes PRN ciwa >20 HPI:  · HPI Objective Statement: 48yoM; with pmh signif for Alcohol Abuse, Heroin Abuse, HTN, HLD; now p/w ams. patient found unresponsive by mother on bathroom ground next to back of heroin and then patient began having seizure. upon ems arrival, patient with snoring respirations, given narcan 2mg and began having seizure again, given versed 5mg IV.  then became combative. patient arrived persistently combative despite repeat versed doses with sonorous respirations.  intubated for airway protection.  	    Mom says patient suffers with ETOH abuse, and feels he used heroin this evening because he couldn't afford beer.  Unknown when last drink was .    In ED patient seized several more times (4-5), thru propofol and versed drip.  Currently being transitioned to ativan drip (02 Mar 2019 07:00)      24 hr events: No acute events. Dose of phenobarb increased. Precedex turned off this morning. Reports no chest pain, dyspnea, fevers, chills, nausea, emesis, or diarrhea. Reports significant pain in upper gums.    ## ROS:  See above. ROS otherwise negative.    ## Vitals  ICU Vital Signs Last 24 Hrs  T(C): 37.2 (04 Mar 2019 07:13), Max: 37.6 (03 Mar 2019 23:00)  T(F): 99 (04 Mar 2019 07:13), Max: 99.7 (03 Mar 2019 23:00)  HR: 94 (04 Mar 2019 09:00) (74 - 100)  BP: 149/125 (04 Mar 2019 08:36) (119/58 - 179/100)  BP(mean): 130 (04 Mar 2019 08:36) (73 - 130)  ABP: --  ABP(mean): --  RR: 14 (04 Mar 2019 09:00) (14 - 45)  SpO2: 96% (04 Mar 2019 09:00) (95% - 100%)      ## Physical Exam:  Gen: Adult male lying in bed, NAD  HEENT: NC/AT sclerae anicteric  Resp: No increased WOB, CTAB  CV: S1, S2  Abd: Soft, + BS  Ext: No edema  Neuro: Awake, alert, follows commands, responds to questions, moves all extremities  Psych: No insight into current medical condition    ## Vent Data      ## Labs:  Chem:  03-04    139  |  106  |  5<L>  ----------------------------<  97  3.6   |  25  |  0.54    Ca    8.2<L>      04 Mar 2019 04:26  Phos  2.4     03-04  Mg     2.3     03-04        CBC:                        13.3   13.37 )-----------( 347      ( 04 Mar 2019 04:26 )             39.5     Coags:          ## Cardiac        ## Blood Gas      #I/Os  I&O's Detail    03 Mar 2019 07:01  -  04 Mar 2019 07:00  --------------------------------------------------------  IN:    dexmedetomidine Infusion: 156.1 mL    multivitamin/thiamine/folic acid in sodium chloride 0.9%: 375 mL    Oral Fluid: 535 mL  Total IN: 1066.1 mL    OUT:    Indwelling Catheter - Urethral: 2950 mL  Total OUT: 2950 mL    Total NET: -1883.9 mL      04 Mar 2019 07:01  -  04 Mar 2019 09:43  --------------------------------------------------------  IN:  Total IN: 0 mL    OUT:    Indwelling Catheter - Urethral: 100 mL  Total OUT: 100 mL    Total NET: -100 mL          ## Imaging:    ## Medications:  MEDICATIONS  (STANDING):  dexmedetomidine Infusion 0.4 MICROgram(s)/kG/Hr (8.47 mL/Hr) IV Continuous <Continuous>  enoxaparin Injectable 40 milliGRAM(s) SubCutaneous daily  nicotine - 21 mG/24Hr(s) Patch 1 patch Transdermal daily  PHENobarbital Injectable 130 milliGRAM(s) IV Push every 6 hours    MEDICATIONS  (PRN):  acetaminophen  Suppository .. 650 milliGRAM(s) Rectal every 6 hours PRN Temp greater or equal to 38C (100.4F)  PHENobarbital Injectable 130 milliGRAM(s) IV Push every 15 minutes PRN ciwa >8  PHENobarbital Injectable 260 milliGRAM(s) IV Push every 15 minutes PRN ciwa >20

## 2019-03-04 NOTE — DIETITIAN INITIAL EVALUATION ADULT. - PERTINENT MEDS FT
MEDICATIONS  (STANDING):  dexmedetomidine Infusion 0.4 MICROgram(s)/kG/Hr (8.47 mL/Hr) IV Continuous <Continuous>  enoxaparin Injectable 40 milliGRAM(s) SubCutaneous daily  nicotine - 21 mG/24Hr(s) Patch 1 patch Transdermal daily  PHENobarbital Injectable 130 milliGRAM(s) IV Push every 6 hours    MEDICATIONS  (PRN):  acetaminophen  Suppository .. 650 milliGRAM(s) Rectal every 6 hours PRN Temp greater or equal to 38C (100.4F)  PHENobarbital Injectable 130 milliGRAM(s) IV Push every 15 minutes PRN ciwa >8  PHENobarbital Injectable 260 milliGRAM(s) IV Push every 15 minutes PRN ciwa >20

## 2019-03-04 NOTE — DIETITIAN INITIAL EVALUATION ADULT. - OTHER INFO
Pt seen today due to Admission to CCU. He lives with his mom whom does the food shopping/cooking low sodium. Pt w/ poor dentition, denies chewing/swallowing difficulty. Denies food allergies. Pt remains on 1:1 observation admitted w/ Dx alcohol withdrawal. Consuming 100% of meals.

## 2019-03-04 NOTE — CHART NOTE - NSCHARTNOTEFT_GEN_A_CORE
48yoM; with pmh signif for Alcohol Abuse, Heroin Abuse, HTN, HLD; now p/w ams. patient found unresponsive by mother on bathroom ground next to back of heroin and then patient began having seizure. upon ems arrival, patient with snoring respirations, given narcan 2mg and began having seizure again, given versed 5mg IV.  then became combative. patient arrived persistently combative despite repeat versed doses with sonorous respirations.  intubated for airway protection.  Patient is extubated on 3/2, doing ok on nasal cannula.    Patient was weaned off precedex drip this morning and phenobarbital is increased to 130mg every 6hours with prn     Patient remain on one to one, is otherwise stable for transfer to med/surg    patient will need outpatient dental follow up     signed out to Hospitalist carry bp number [phone]

## 2019-03-04 NOTE — DIETITIAN INITIAL EVALUATION ADULT. - PERTINENT LABORATORY DATA
03-04 Na139 mmol/L Glu 97 mg/dL K+ 3.6 mmol/L Cr  0.54 mg/dL BUN 5 mg/dL<L> 03-04 Phos 2.4 mg/dL<L> 03-02 Alb 3.5 g/dL03-02 ALT 40 U/L AST 44 U/L<H> Alkaline Phosphatase 57 U/L

## 2019-03-05 LAB
ALBUMIN SERPL ELPH-MCNC: 3.1 G/DL — LOW (ref 3.3–5)
ALBUMIN SERPL ELPH-MCNC: 3.4 G/DL — SIGNIFICANT CHANGE UP (ref 3.3–5)
ALP SERPL-CCNC: 46 U/L — SIGNIFICANT CHANGE UP (ref 40–120)
ALP SERPL-CCNC: 53 U/L — SIGNIFICANT CHANGE UP (ref 40–120)
ALT FLD-CCNC: 28 U/L — SIGNIFICANT CHANGE UP (ref 12–78)
ALT FLD-CCNC: 32 U/L — SIGNIFICANT CHANGE UP (ref 12–78)
ANION GAP SERPL CALC-SCNC: 6 MMOL/L — SIGNIFICANT CHANGE UP (ref 5–17)
ANION GAP SERPL CALC-SCNC: 7 MMOL/L — SIGNIFICANT CHANGE UP (ref 5–17)
AST SERPL-CCNC: 18 U/L — SIGNIFICANT CHANGE UP (ref 15–37)
AST SERPL-CCNC: 20 U/L — SIGNIFICANT CHANGE UP (ref 15–37)
BASE EXCESS BLDA CALC-SCNC: 3.7 MMOL/L — HIGH (ref -2–2)
BILIRUB DIRECT SERPL-MCNC: 0.07 MG/DL — SIGNIFICANT CHANGE UP (ref 0.05–0.2)
BILIRUB INDIRECT FLD-MCNC: 0.2 MG/DL — SIGNIFICANT CHANGE UP (ref 0.2–1)
BILIRUB SERPL-MCNC: 0.3 MG/DL — SIGNIFICANT CHANGE UP (ref 0.2–1.2)
BILIRUB SERPL-MCNC: 0.5 MG/DL — SIGNIFICANT CHANGE UP (ref 0.2–1.2)
BLOOD GAS COMMENTS: SIGNIFICANT CHANGE UP
BLOOD GAS COMMENTS: SIGNIFICANT CHANGE UP
BLOOD GAS SOURCE: SIGNIFICANT CHANGE UP
BUN SERPL-MCNC: 10 MG/DL — SIGNIFICANT CHANGE UP (ref 7–23)
BUN SERPL-MCNC: 8 MG/DL — SIGNIFICANT CHANGE UP (ref 7–23)
CALCIUM SERPL-MCNC: 8.2 MG/DL — LOW (ref 8.5–10.1)
CALCIUM SERPL-MCNC: 8.4 MG/DL — LOW (ref 8.5–10.1)
CHLORIDE SERPL-SCNC: 106 MMOL/L — SIGNIFICANT CHANGE UP (ref 96–108)
CHLORIDE SERPL-SCNC: 106 MMOL/L — SIGNIFICANT CHANGE UP (ref 96–108)
CO2 SERPL-SCNC: 28 MMOL/L — SIGNIFICANT CHANGE UP (ref 22–31)
CO2 SERPL-SCNC: 28 MMOL/L — SIGNIFICANT CHANGE UP (ref 22–31)
CREAT SERPL-MCNC: 0.61 MG/DL — SIGNIFICANT CHANGE UP (ref 0.5–1.3)
CREAT SERPL-MCNC: 0.66 MG/DL — SIGNIFICANT CHANGE UP (ref 0.5–1.3)
GLUCOSE SERPL-MCNC: 105 MG/DL — HIGH (ref 70–99)
GLUCOSE SERPL-MCNC: 117 MG/DL — HIGH (ref 70–99)
HCO3 BLDA-SCNC: 26 MMOL/L — SIGNIFICANT CHANGE UP (ref 21–29)
HCT VFR BLD CALC: 44.8 % — SIGNIFICANT CHANGE UP (ref 39–50)
HGB BLD-MCNC: 14.5 G/DL — SIGNIFICANT CHANGE UP (ref 13–17)
HOROWITZ INDEX BLDA+IHG-RTO: 21 — SIGNIFICANT CHANGE UP
MAGNESIUM SERPL-MCNC: 2.2 MG/DL — SIGNIFICANT CHANGE UP (ref 1.6–2.6)
MAGNESIUM SERPL-MCNC: 2.6 MG/DL — SIGNIFICANT CHANGE UP (ref 1.6–2.6)
MCHC RBC-ENTMCNC: 29.3 PG — SIGNIFICANT CHANGE UP (ref 27–34)
MCHC RBC-ENTMCNC: 32.4 GM/DL — SIGNIFICANT CHANGE UP (ref 32–36)
MCV RBC AUTO: 90.5 FL — SIGNIFICANT CHANGE UP (ref 80–100)
NRBC # BLD: 0 /100 WBCS — SIGNIFICANT CHANGE UP (ref 0–0)
PCO2 BLDA: 36 MMHG — SIGNIFICANT CHANGE UP (ref 32–46)
PH BLD: 7.49 — HIGH (ref 7.35–7.45)
PHOSPHATE SERPL-MCNC: 3.1 MG/DL — SIGNIFICANT CHANGE UP (ref 2.5–4.5)
PHOSPHATE SERPL-MCNC: 3.1 MG/DL — SIGNIFICANT CHANGE UP (ref 2.5–4.5)
PLATELET # BLD AUTO: 340 K/UL — SIGNIFICANT CHANGE UP (ref 150–400)
PO2 BLDA: 82 MMHG — SIGNIFICANT CHANGE UP (ref 74–108)
POTASSIUM SERPL-MCNC: 3.2 MMOL/L — LOW (ref 3.5–5.3)
POTASSIUM SERPL-MCNC: 3.5 MMOL/L — SIGNIFICANT CHANGE UP (ref 3.5–5.3)
POTASSIUM SERPL-SCNC: 3.2 MMOL/L — LOW (ref 3.5–5.3)
POTASSIUM SERPL-SCNC: 3.5 MMOL/L — SIGNIFICANT CHANGE UP (ref 3.5–5.3)
PROT SERPL-MCNC: 7.1 GM/DL — SIGNIFICANT CHANGE UP (ref 6–8.3)
PROT SERPL-MCNC: 7.3 GM/DL — SIGNIFICANT CHANGE UP (ref 6–8.3)
RBC # BLD: 4.95 M/UL — SIGNIFICANT CHANGE UP (ref 4.2–5.8)
RBC # FLD: 13.7 % — SIGNIFICANT CHANGE UP (ref 10.3–14.5)
SAO2 % BLDA: 96 % — SIGNIFICANT CHANGE UP (ref 92–96)
SODIUM SERPL-SCNC: 140 MMOL/L — SIGNIFICANT CHANGE UP (ref 135–145)
SODIUM SERPL-SCNC: 141 MMOL/L — SIGNIFICANT CHANGE UP (ref 135–145)
WBC # BLD: 14.51 K/UL — HIGH (ref 3.8–10.5)
WBC # FLD AUTO: 14.51 K/UL — HIGH (ref 3.8–10.5)

## 2019-03-05 PROCEDURE — 99233 SBSQ HOSP IP/OBS HIGH 50: CPT

## 2019-03-05 RX ORDER — POTASSIUM CHLORIDE 20 MEQ
40 PACKET (EA) ORAL EVERY 4 HOURS
Qty: 0 | Refills: 0 | Status: COMPLETED | OUTPATIENT
Start: 2019-03-05 | End: 2019-03-06

## 2019-03-05 RX ADMIN — Medication 130 MILLIGRAM(S): at 05:54

## 2019-03-05 RX ADMIN — Medication 40 MILLIEQUIVALENT(S): at 23:18

## 2019-03-05 RX ADMIN — Medication 1 PATCH: at 18:09

## 2019-03-05 RX ADMIN — Medication 650 MILLIGRAM(S): at 00:48

## 2019-03-05 RX ADMIN — Medication 2 MILLIGRAM(S): at 23:18

## 2019-03-05 RX ADMIN — Medication 2 MILLIGRAM(S): at 15:16

## 2019-03-05 RX ADMIN — Medication 130 MILLIGRAM(S): at 00:07

## 2019-03-05 RX ADMIN — Medication 1 PATCH: at 15:02

## 2019-03-05 RX ADMIN — Medication 650 MILLIGRAM(S): at 00:18

## 2019-03-05 RX ADMIN — ENOXAPARIN SODIUM 40 MILLIGRAM(S): 100 INJECTION SUBCUTANEOUS at 14:17

## 2019-03-05 RX ADMIN — Medication 2 MILLIGRAM(S): at 18:08

## 2019-03-05 RX ADMIN — Medication 1 PATCH: at 15:01

## 2019-03-05 NOTE — PROGRESS NOTE ADULT - PROBLEM SELECTOR PLAN 7
there's a hole in the upper palate --no discharge or foul smell noted, stated it happened after his dental implants were removed while hospitalized at [facility]???? does not know reason.  Will likely need OMFS vs dental? no active infection, therefore can be followed outpatient. explained to patient.

## 2019-03-05 NOTE — PROGRESS NOTE ADULT - PROBLEM SELECTOR PLAN 1
unclear if ETOH related seizure? DTs? Patient stated his last drink was day of admission however ETOH level   no further seizures, patient was transferred from ICU on standing and prn phenobarbital, was sedated and only arousable to painful stimuli, phenobarb d/c'd, upgraded to tele, ABG noted, started on ativan only after patient became fully resposive and awake, AOx3, answering appropriately. Doing well now.   Neuro consulted. Dr. Diez. unclear if ETOH withdrawal related seizure? DTs? Patient stated his last drink was day of admission however ETOH level 10  patient poor historian, states had DTs in past, can't tell when.   no further seizures, patient was transferred from ICU on standing and prn phenobarbital, was sedated and only arousable to painful stimuli, phenobarb d/c'd, upgraded to tele, ABG noted, started on ativan only after patient became fully resposive and awake, AOx3, answering appropriately. Doing well now.   Neuro consulted. Dr. Diez.

## 2019-03-05 NOTE — PROGRESS NOTE ADULT - SUBJECTIVE AND OBJECTIVE BOX
HPI:  · HPI Objective Statement: 48yoM; with pmh signif for Alcohol Abuse, Heroin Abuse, HTN, HLD; now p/w ams. patient found unresponsive by mother on bathroom ground next to back of heroin and then patient began having seizure. upon ems arrival, patient with snoring respirations, given narcan 2mg and began having seizure again, given versed 5mg IV.  then became combative. patient arrived persistently combative despite repeat versed doses with sonorous respirations.  intubated for airway protection.  	    Mom says patient suffers with ETOH abuse, and feels he used heroin this evening because he couldn't afford beer.  Unknown when last drink was .    In ED patient seized several more times (4-5), thru propofol and versed drip.  Currently being transitioned to ativan drip (02 Mar 2019 07:00)      SUBJECTIVE & OBJECTIVE:   Pt seen and examined at bedside.   Has no complaints. Initially denied using heroin then admitted to using. Also, States he drinks 6-pack of beer daily.  States was in AA for a long time but then started drinking again.     Denies fever, chills, N/V, dizziness, HA, cough, CP, palpitations, SOB, abdominal pain, dysuria, diarrhea, constipation.     PHYSICAL EXAM:  Vitals stable.     GENERAL: NAD, NOT toxic appearing   HEAD:  Atraumatic, Normocephalic  EYES: EOMI, PERRLA, small subconjunctival hemorrhage --left eye   ENMT: Moist mucous membranes, poor dentition, hole in the hard palate --no discharge/foul smell noted, edges are clean --appears old and chronic   NECK: Supple, No JVD  NERVOUS SYSTEM:  Alert & Oriented X3, no focal neuro deficits   CHEST/LUNG: Clear to auscultation bilaterally; No rales, rhonchi, wheezing, or rubs, no labored breathing   HEART: Regular rate and rhythm; No murmurs, rubs, or gallops  ABDOMEN: Soft, Nontender, Nondistended; Bowel sounds present  EXTREMITIES:  2+ Peripheral Pulses b/l, No cyanosis, or edema b/l, no calf tenderness b/l     MEDICATIONS  (STANDING):  enoxaparin Injectable 40 milliGRAM(s) SubCutaneous daily  LORazepam   Injectable 2 milliGRAM(s) IV Push every 4 hours  nicotine - 21 mG/24Hr(s) Patch 1 patch Transdermal daily    MEDICATIONS  (PRN):  acetaminophen  Suppository .. 650 milliGRAM(s) Rectal every 6 hours PRN Temp greater or equal to 38C (100.4F)  LORazepam   Injectable 2 milliGRAM(s) IV Push every 2 hours PRN if CIWA >8 and notify MD  simethicone 80 milliGRAM(s) Chew every 8 hours PRN Gas      Labs and imaging reviewed.               ABG - ( 05 Mar 2019 12:43 )  pH, Arterial: x     pH, Blood: 7.49  /  pCO2: 36    /  pO2: 82    / HCO3: 26    / Base Excess: 3.7   /  SaO2: 96              RADIOLOGY & ADDITIONAL TESTS:  Imaging Personally Reviewed:  [x ] YES  [ ] NO    Consultant(s) Notes Reviewed:  [x ] YES  [ ] NO    Care Discussed with Consultants/Other Providers [ x] YES  [ ] NO    Care discussed in detail with patient.  All questions and concerns addressed. HPI:  · HPI Objective Statement: 48yoM; with pmh signif for Alcohol Abuse, Heroin Abuse, HTN, HLD; now p/w ams. patient found unresponsive by mother on bathroom ground next to back of heroin and then patient began having seizure. upon ems arrival, patient with snoring respirations, given narcan 2mg and began having seizure again, given versed 5mg IV.  then became combative. patient arrived persistently combative despite repeat versed doses with sonorous respirations.  intubated for airway protection.  	    Mom says patient suffers with ETOH abuse, and feels he used heroin this evening because he couldn't afford beer.  Unknown when last drink was .    In ED patient seized several more times (4-5), thru propofol and versed drip.  Currently being transitioned to ativan drip (02 Mar 2019 07:00)      SUBJECTIVE & OBJECTIVE:   Pt seen and examined at bedside.   Has no complaints. Initially denied using heroin then admitted to using. Also, States he drinks 6-pack of beer daily.  States was in AA for a long time but then started drinking again.     Denies fever, chills, N/V, dizziness, HA, cough, CP, palpitations, SOB, abdominal pain, dysuria, diarrhea, constipation.     PHYSICAL EXAM:  Vitals stable.     GENERAL: NAD, NOT toxic appearing   HEAD:  Atraumatic, Normocephalic  EYES: EOMI, PERRLA, small subconjunctival hemorrhage --left eye   ENMT: Moist mucous membranes, poor dentition, hole in the hard palate --no discharge/foul smell noted, edges are clean --appears old and chronic   NECK: Supple, No JVD  NERVOUS SYSTEM:  Alert & Oriented X3, no focal neuro deficits   CHEST/LUNG: Clear to auscultation bilaterally; No rales, rhonchi, wheezing, or rubs, no labored breathing   HEART: Regular rate and rhythm; No murmurs, rubs, or gallops  ABDOMEN: Soft, Nontender, Nondistended; Bowel sounds present  EXTREMITIES:  2+ Peripheral Pulses b/l, No cyanosis, or edema b/l, no calf tenderness b/l     MEDICATIONS  (STANDING):  enoxaparin Injectable 40 milliGRAM(s) SubCutaneous daily  LORazepam   Injectable 2 milliGRAM(s) IV Push every 4 hours  nicotine - 21 mG/24Hr(s) Patch 1 patch Transdermal daily    MEDICATIONS  (PRN):  acetaminophen  Suppository .. 650 milliGRAM(s) Rectal every 6 hours PRN Temp greater or equal to 38C (100.4F)  LORazepam   Injectable 2 milliGRAM(s) IV Push every 2 hours PRN if CIWA >8 and notify MD  simethicone 80 milliGRAM(s) Chew every 8 hours PRN Gas      Labs and imaging reviewed.           Alcohol, Blood (03.02.19 @ 04:35)    Alcohol, Blood: 10: TOXIC CONCENTRATIONS (mg/dL):  Flushing, Slowing of  Reflexes, Impaired Visual Acuity:     Depression of CNS:    > 100  Fatalities Reported:       > 400  Results reported as a "< number" are below reliably detectable limits and  considered negative  These ranges are intended as general guidelines.  Alcohol metabolism can vary widely among individuals.  This test  is approved for clinical and not for forensic purposes. mg/dL        ABG - ( 05 Mar 2019 12:43 )  pH, Arterial: x     pH, Blood: 7.49  /  pCO2: 36    /  pO2: 82    / HCO3: 26    / Base Excess: 3.7   /  SaO2: 96              RADIOLOGY & ADDITIONAL TESTS:  Imaging Personally Reviewed:  [x ] YES  [ ] NO    Consultant(s) Notes Reviewed:  [x ] YES  [ ] NO    Care Discussed with Consultants/Other Providers [ x] YES  [ ] NO    Care discussed in detail with patient.  All questions and concerns addressed.

## 2019-03-05 NOTE — PROGRESS NOTE ADULT - PROBLEM SELECTOR PLAN 4
counseling on cessation provided,  to follow  MVI, thiamine, folic acid no e/o withdrawal, CIWA 1-2   counseling on cessation provided,  to follow  MVI, thiamine, folic acid

## 2019-03-06 DIAGNOSIS — F17.210 NICOTINE DEPENDENCE, CIGARETTES, UNCOMPLICATED: ICD-10-CM

## 2019-03-06 DIAGNOSIS — K00.9 DISORDER OF TOOTH DEVELOPMENT, UNSPECIFIED: ICD-10-CM

## 2019-03-06 DIAGNOSIS — Z29.9 ENCOUNTER FOR PROPHYLACTIC MEASURES, UNSPECIFIED: ICD-10-CM

## 2019-03-06 DIAGNOSIS — D72.828 OTHER ELEVATED WHITE BLOOD CELL COUNT: ICD-10-CM

## 2019-03-06 DIAGNOSIS — E87.6 HYPOKALEMIA: ICD-10-CM

## 2019-03-06 DIAGNOSIS — F11.10 OPIOID ABUSE, UNCOMPLICATED: ICD-10-CM

## 2019-03-06 LAB
ANION GAP SERPL CALC-SCNC: 6 MMOL/L — SIGNIFICANT CHANGE UP (ref 5–17)
BUN SERPL-MCNC: 10 MG/DL — SIGNIFICANT CHANGE UP (ref 7–23)
CALCIUM SERPL-MCNC: 8.3 MG/DL — LOW (ref 8.5–10.1)
CHLORIDE SERPL-SCNC: 108 MMOL/L — SIGNIFICANT CHANGE UP (ref 96–108)
CO2 SERPL-SCNC: 26 MMOL/L — SIGNIFICANT CHANGE UP (ref 22–31)
CREAT SERPL-MCNC: 0.7 MG/DL — SIGNIFICANT CHANGE UP (ref 0.5–1.3)
GLUCOSE SERPL-MCNC: 105 MG/DL — HIGH (ref 70–99)
HCT VFR BLD CALC: 45.5 % — SIGNIFICANT CHANGE UP (ref 39–50)
HGB BLD-MCNC: 14.9 G/DL — SIGNIFICANT CHANGE UP (ref 13–17)
MAGNESIUM SERPL-MCNC: 2.2 MG/DL — SIGNIFICANT CHANGE UP (ref 1.6–2.6)
MCHC RBC-ENTMCNC: 29.7 PG — SIGNIFICANT CHANGE UP (ref 27–34)
MCHC RBC-ENTMCNC: 32.7 GM/DL — SIGNIFICANT CHANGE UP (ref 32–36)
MCV RBC AUTO: 90.8 FL — SIGNIFICANT CHANGE UP (ref 80–100)
NRBC # BLD: 0 /100 WBCS — SIGNIFICANT CHANGE UP (ref 0–0)
PHOSPHATE SERPL-MCNC: 3.5 MG/DL — SIGNIFICANT CHANGE UP (ref 2.5–4.5)
PLATELET # BLD AUTO: 349 K/UL — SIGNIFICANT CHANGE UP (ref 150–400)
POTASSIUM SERPL-MCNC: 4.1 MMOL/L — SIGNIFICANT CHANGE UP (ref 3.5–5.3)
POTASSIUM SERPL-SCNC: 4.1 MMOL/L — SIGNIFICANT CHANGE UP (ref 3.5–5.3)
RBC # BLD: 5.01 M/UL — SIGNIFICANT CHANGE UP (ref 4.2–5.8)
RBC # FLD: 13.6 % — SIGNIFICANT CHANGE UP (ref 10.3–14.5)
SODIUM SERPL-SCNC: 140 MMOL/L — SIGNIFICANT CHANGE UP (ref 135–145)
WBC # BLD: 13.18 K/UL — HIGH (ref 3.8–10.5)
WBC # FLD AUTO: 13.18 K/UL — HIGH (ref 3.8–10.5)

## 2019-03-06 PROCEDURE — 99232 SBSQ HOSP IP/OBS MODERATE 35: CPT

## 2019-03-06 RX ORDER — FOLIC ACID 0.8 MG
1 TABLET ORAL DAILY
Qty: 0 | Refills: 0 | Status: DISCONTINUED | OUTPATIENT
Start: 2019-03-06 | End: 2019-03-08

## 2019-03-06 RX ORDER — THIAMINE MONONITRATE (VIT B1) 100 MG
100 TABLET ORAL DAILY
Qty: 0 | Refills: 0 | Status: DISCONTINUED | OUTPATIENT
Start: 2019-03-06 | End: 2019-03-08

## 2019-03-06 RX ADMIN — Medication 30 MILLILITER(S): at 17:55

## 2019-03-06 RX ADMIN — Medication 2 MILLIGRAM(S): at 03:04

## 2019-03-06 RX ADMIN — Medication 40 MILLIEQUIVALENT(S): at 03:05

## 2019-03-06 RX ADMIN — Medication 2 MILLIGRAM(S): at 21:52

## 2019-03-06 RX ADMIN — Medication 2 MILLIGRAM(S): at 09:53

## 2019-03-06 RX ADMIN — Medication 1 PATCH: at 09:00

## 2019-03-06 RX ADMIN — ENOXAPARIN SODIUM 40 MILLIGRAM(S): 100 INJECTION SUBCUTANEOUS at 12:44

## 2019-03-06 RX ADMIN — Medication 1 TABLET(S): at 12:40

## 2019-03-06 RX ADMIN — Medication 2 MILLIGRAM(S): at 13:28

## 2019-03-06 RX ADMIN — Medication 1 PATCH: at 12:40

## 2019-03-06 RX ADMIN — Medication 2 MILLIGRAM(S): at 17:30

## 2019-03-06 RX ADMIN — Medication 2 MILLIGRAM(S): at 06:05

## 2019-03-06 RX ADMIN — Medication 1 MILLIGRAM(S): at 12:41

## 2019-03-06 RX ADMIN — Medication 100 MILLIGRAM(S): at 12:40

## 2019-03-06 RX ADMIN — Medication 1 PATCH: at 19:00

## 2019-03-06 RX ADMIN — Medication 1 PATCH: at 12:38

## 2019-03-06 NOTE — PROGRESS NOTE ADULT - SUBJECTIVE AND OBJECTIVE BOX
CHIEF COMPLAINT/INTERVAL HISTORY:    Patient is a 49y old  Male who presents with a chief complaint of fd unresponsive, incontinent, seizing (05 Mar 2019 17:46)      HPI:  · HPI Objective Statement: 48yoM; with pmh signif for Alcohol Abuse, Heroin Abuse, HTN, HLD; now p/w ams. patient found unresponsive by mother on bathroom ground next to back of heroin and then patient began having seizure. upon ems arrival, patient with snoring respirations, given narcan 2mg and began having seizure again, given versed 5mg IV.  then became combative. patient arrived persistently combative despite repeat versed doses with sonorous respirations.  intubated for airway protection.  	    Mom says patient suffers with ETOH abuse, and feels he used heroin this evening because he couldn't afford beer.  Unknown when last drink was .    In ED patient seized several more times (4-5), thru propofol and versed drip.  Currently being transitioned to ativan drip (02 Mar 2019 07:00)      SUBJECTIVE & OBJECTIVE: Pt seen and examined at bedside. Alert/ awake/ confused, on one to one observation.  Unable to get ROS.    Vital Signs Last 24 Hrs  T(C): 37.1 (06 Mar 2019 05:32), Max: 37.1 (06 Mar 2019 05:32)  T(F): 98.7 (06 Mar 2019 05:32), Max: 98.7 (06 Mar 2019 05:32)  HR: 97 (06 Mar 2019 05:32) (97 - 98)  BP: 150/87 (06 Mar 2019 05:32) (141/99 - 150/95)  BP(mean): --  ABP: --  ABP(mean): --  RR: 19 (06 Mar 2019 05:32) (18 - 19)  SpO2: 95% (06 Mar 2019 05:32) (95% - 96%)        MEDICATIONS  (STANDING):  enoxaparin Injectable 40 milliGRAM(s) SubCutaneous daily  folic acid 1 milliGRAM(s) Oral daily  LORazepam   Injectable 2 milliGRAM(s) IV Push every 4 hours  multivitamin 1 Tablet(s) Oral daily  nicotine - 21 mG/24Hr(s) Patch 1 patch Transdermal daily  thiamine 100 milliGRAM(s) Oral daily    MEDICATIONS  (PRN):  LORazepam   Injectable 2 milliGRAM(s) IV Push every 2 hours PRN if CIWA >8 and notify MD  simethicone 80 milliGRAM(s) Chew every 8 hours PRN Gas        PHYSICAL EXAM:    GENERAL: NAD,  well-developed  HEAD:  Atraumatic, Normocephalic  EYES: EOMI, PERRLA, conjunctiva and sclera clear  ENMT: Moist mucous membranes  NECK: Supple, No JVD  NERVOUS SYSTEM:  alert/awake, mildly confused.  CHEST/LUNG: Clear to auscultation bilaterally; No rales, rhonchi, wheezing, or rubs  HEART: Regular rate and rhythm;  ABDOMEN: Soft, Nontender, Nondistended; Bowel sounds present  EXTREMITIES:  no cyanosis, or edema    LABS:                        14.9   13.18 )-----------( 349      ( 06 Mar 2019 06:36 )             45.5     03-06    140  |  108  |  10  ----------------------------<  105<H>  4.1   |  26  |  0.70    Ca    8.3<L>      06 Mar 2019 06:36  Phos  3.5     03-06  Mg     2.2     03-06    TPro  7.1  /  Alb  3.1<L>  /  TBili  0.3  /  DBili  .07  /  AST  20  /  ALT  32  /  AlkPhos  46  03-05

## 2019-03-06 NOTE — PROGRESS NOTE ADULT - PROBLEM SELECTOR PLAN 7
hole in the upper palate --no discharge or foul smell noted, stated it happened after his dental implants were removed while hospitalized at [facility]???? does not know reason. will need o/p fu with [facility] or dentist.

## 2019-03-06 NOTE — PROGRESS NOTE ADULT - ASSESSMENT
47 y/o M with PMH of Alcohol Abuse, Heroin Abuse, HTN, HLD;  admitted for multiple seizures in setting of alcohol and drug abuse. was found unresponsive on bathroom floor, Pt received narcane in the field, in ed he was combative and with multiple seizures, was intubated for airway protection. Started on propofol and versed drip. Patient admitted to ICU, was extubated on 3/2, doing well on NC, precedex was d/c'd and phenobarb IVP standing and prn was started, transferred to floors 3/4/19. Now doing well off oxygen, phenobarb was changed to ativan. Await neuro eval.
48yoM; with pmh signif for Alcohol Abuse, Heroin Abuse, HTN, HLD; now p/w ams. patient found unresponsive by mother on bathroom ground next to back of heroin and then patient began having seizure. upon ems arrival, patient with snoring respirations, given narcan 2mg and began having seizure again, given versed 5mg IV.  then became combative. patient arrived persistently combative despite repeat versed doses with sonorous respirations.  intubated for airway protection. In ED patient seized several more times (4-5), thru propofol and versed drip. Patient admitted to ICU, was extubated on 3/2, doing well on NC, precedex was d/c'd and phenobarb IVP stabnding and prnwas started, transferred to floors 3/4/19. Now doing well off oxygen, phenobarb was changed to ativan. will continue to monitor. If doing well on ativan, can downgrade to reg floor from tele.
47 y/o M w/polysubstance abuse presenting with seizures likely secondary to alcohol withdrawal requiring precedex gtt. Initially intubated for acute respiratory failure in the setting of sedation, now extubated. Off precedex, doing well on standing phenobarb.     - Continue standing phenobarb  - Thiamine, folate, MVI  - No dentistry available inpatient, will need outpatient dental visit  - nicotiene patch  - Downgrade to floor
48M PMH alcohol abuse (24 beers a day) and drug abuse (heroin) presents after being found unresponsive by mother seizing (tonic clonic) with bag of heroin next to patient. Intubated in ED for resp failure, airway protection due to sonorous breathing. Admitted to ICU for seizures, likely alcohol withdrawal seizures, acute respiratory failure requiring intubation.     1. NEURO  - no further seizures noted since admission  - cont with precedex to help with withdrawal as well as with sedation  - d/c librium as pt not taking po intake at present time due to encephalopathy from withdrawal. will give phenobarbital standing with prn doses, wean off precedex drip as tolerates  - monitor CIWA  - MVI. thiamine. folate  - ativan prn seizures    2. PULM  - weaned and extubated yesterday  - doing well on nasal cannula, no respiratory issues    3. ID  - mild leukocytosis and fever can be in setting of acute etoh withdrawal vs recent leukemoid reaction with seizures  - monitor off antibiotics  - monitor fever curve  - trend WBC if continues to increase check blood and urine cx  - CXR without definitive infiltrates  - check procalcitonin levels    4. GEN  - supplement electrolytes as needed  - remains in ICU while on precedex  - mother updated on condition    Critical Care Time: 40 min

## 2019-03-06 NOTE — PROGRESS NOTE ADULT - PROBLEM SELECTOR PLAN 1
unclear if ETOH withdrawal related seizure? DTs? Patient stated his last drink was day of admission however ETOH level 10  patient poor historian, states had DTs in past, can't tell when.   no further seizures, patient was transferred from ICU on standing and prn phenobarbital, was sedated and only arousable to painful stimuli, phenobarb d/c'd, ?alcohol withdrawal vs benzo withdrawal  Urine tox + for benzo/ opiates  s/p ICU course  on ativan for now  await neuro eval Dr. Diez.

## 2019-03-07 LAB
ANION GAP SERPL CALC-SCNC: 7 MMOL/L — SIGNIFICANT CHANGE UP (ref 5–17)
BUN SERPL-MCNC: 9 MG/DL — SIGNIFICANT CHANGE UP (ref 7–23)
CALCIUM SERPL-MCNC: 8.3 MG/DL — LOW (ref 8.5–10.1)
CHLORIDE SERPL-SCNC: 102 MMOL/L — SIGNIFICANT CHANGE UP (ref 96–108)
CO2 SERPL-SCNC: 29 MMOL/L — SIGNIFICANT CHANGE UP (ref 22–31)
CREAT SERPL-MCNC: 0.73 MG/DL — SIGNIFICANT CHANGE UP (ref 0.5–1.3)
GLUCOSE SERPL-MCNC: 145 MG/DL — HIGH (ref 70–99)
HCT VFR BLD CALC: 45 % — SIGNIFICANT CHANGE UP (ref 39–50)
HGB BLD-MCNC: 14 G/DL — SIGNIFICANT CHANGE UP (ref 13–17)
MCHC RBC-ENTMCNC: 29.4 PG — SIGNIFICANT CHANGE UP (ref 27–34)
MCHC RBC-ENTMCNC: 31.1 GM/DL — LOW (ref 32–36)
MCV RBC AUTO: 94.3 FL — SIGNIFICANT CHANGE UP (ref 80–100)
NRBC # BLD: 0 /100 WBCS — SIGNIFICANT CHANGE UP (ref 0–0)
PLATELET # BLD AUTO: 272 K/UL — SIGNIFICANT CHANGE UP (ref 150–400)
POTASSIUM SERPL-MCNC: 3.5 MMOL/L — SIGNIFICANT CHANGE UP (ref 3.5–5.3)
POTASSIUM SERPL-SCNC: 3.5 MMOL/L — SIGNIFICANT CHANGE UP (ref 3.5–5.3)
PROCALCITONIN SERPL-MCNC: 0.05 NG/ML — SIGNIFICANT CHANGE UP (ref 0.02–0.1)
RBC # BLD: 4.77 M/UL — SIGNIFICANT CHANGE UP (ref 4.2–5.8)
RBC # FLD: 13.4 % — SIGNIFICANT CHANGE UP (ref 10.3–14.5)
SODIUM SERPL-SCNC: 138 MMOL/L — SIGNIFICANT CHANGE UP (ref 135–145)
WBC # BLD: 10.9 K/UL — HIGH (ref 3.8–10.5)
WBC # FLD AUTO: 10.9 K/UL — HIGH (ref 3.8–10.5)

## 2019-03-07 PROCEDURE — 99233 SBSQ HOSP IP/OBS HIGH 50: CPT

## 2019-03-07 RX ADMIN — Medication 2 MILLIGRAM(S): at 05:40

## 2019-03-07 RX ADMIN — Medication 2 MILLIGRAM(S): at 21:53

## 2019-03-07 RX ADMIN — Medication 2 MILLIGRAM(S): at 14:20

## 2019-03-07 RX ADMIN — Medication 2 MILLIGRAM(S): at 17:17

## 2019-03-07 RX ADMIN — ENOXAPARIN SODIUM 40 MILLIGRAM(S): 100 INJECTION SUBCUTANEOUS at 12:30

## 2019-03-07 RX ADMIN — SIMETHICONE 80 MILLIGRAM(S): 80 TABLET, CHEWABLE ORAL at 12:29

## 2019-03-07 RX ADMIN — Medication 1 TABLET(S): at 12:29

## 2019-03-07 RX ADMIN — Medication 1 PATCH: at 12:29

## 2019-03-07 RX ADMIN — Medication 2 MILLIGRAM(S): at 02:46

## 2019-03-07 RX ADMIN — Medication 2 MILLIGRAM(S): at 10:54

## 2019-03-07 RX ADMIN — Medication 1 PATCH: at 12:20

## 2019-03-07 RX ADMIN — Medication 1 PATCH: at 09:19

## 2019-03-07 RX ADMIN — Medication 1 PATCH: at 19:30

## 2019-03-07 RX ADMIN — Medication 1 MILLIGRAM(S): at 12:29

## 2019-03-07 RX ADMIN — Medication 100 MILLIGRAM(S): at 12:29

## 2019-03-07 NOTE — CONSULT NOTE ADULT - SUBJECTIVE AND OBJECTIVE BOX
Subjective Complaints:  Historian:       Consult requested by ER doctor:                  Attending: DR Higginbotham    HPI:  · HPI Objective Statement: 48yoM; with pmh signif for Alcohol Abuse, Heroin Abuse, HTN, HLD; now p/w ams. patient found unresponsive by mother on bathroom ground next to back of heroin and then patient began having seizure. upon ems arrival, patient with snoring respirations, given narcan 2mg and began having seizure again, given versed 5mg IV.  then became combative. patient arrived persistently combative despite repeat versed doses with sonorous respirations.  intubated for airway protection.  	    Mom says patient suffers with ETOH abuse, and feels he used heroin this evening because he couldn't afford beer.  Unknown when last drink was .    In ED patient seized several more times (4-5), thru propofol and versed drip.  Currently being transitioned to ativan drip (02 Mar 2019 07:00)    ROGE MINA    PAST MEDICAL & SURGICAL HISTORY:  Heroin abuse  ETOH abuse  Substance abuse  Essential hypertension  Pure hypercholesterolemia  No significant past surgical history  49yMale    MEDICATIONS  (STANDING):  enoxaparin Injectable 40 milliGRAM(s) SubCutaneous daily  folic acid 1 milliGRAM(s) Oral daily  LORazepam   Injectable 2 milliGRAM(s) IV Push every 4 hours  multivitamin 1 Tablet(s) Oral daily  nicotine - 21 mG/24Hr(s) Patch 1 patch Transdermal daily  thiamine 100 milliGRAM(s) Oral daily    MEDICATIONS  (PRN):  LORazepam   Injectable 2 milliGRAM(s) IV Push every 2 hours PRN if CIWA >8 and notify MD  simethicone 80 milliGRAM(s) Chew every 8 hours PRN Gas      Allergies    No Known Allergies    Intolerances      FAMILY HISTORY:  No pertinent family history in first degree relatives      REVIEW OF SYSTEMS:  General:  No wt loss, fevers, chills, night sweats  Eyes:  Good vision, no reported pain  ENT:  No sore throat, pain, runny nose, dysphagia  CV:  No pain, palpitatioins, hypo/hypertension  Resp:  No dyspnea, cough, tachypnea, wheezing  GI:  No pain, nausea, vomiting, diarrhea, constipatiion  :  No pain, bleeding, incontinence, nocturia  Muscle:  No pain, weakness  Breast:  No pain, abscess, mass, discharge  Neuro:  No weakness, tingling, memory problems  Psych:  No fatigue, insomnia, mood problems, depression  Endocrine:  No polyuria, polydypsia, cold/heat intolerance  Heme:  No petechiae, ecchymosis, easy bruisability  Skin:  No rash, tattoos, scars, edema      Vital Signs Last 24 Hrs  T(C): 36.5 (07 Mar 2019 05:00), Max: 36.7 (06 Mar 2019 14:14)  T(F): 97.7 (07 Mar 2019 05:00), Max: 98 (06 Mar 2019 14:14)  HR: 97 (07 Mar 2019 05:00) (95 - 103)  BP: 151/87 (07 Mar 2019 05:00) (110/77 - 151/87)  BP(mean): --  RR: 18 (07 Mar 2019 05:00) (16 - 18)  SpO2: 98% (07 Mar 2019 05:00) (96% - 99%)    GENERAL PHYSICAL EXAM:  General:  Appears stated age, well-groomed, well-nourished, no distress  HEENT:  NC/AT, patent nares w/ pink mucosa, OP clear w/o lesions, PERRL, EOMI, conjunctivae clear, no thyromegaly, nodules, adenopathy, no JVD  Chest:  Full & symmetric excursion, no increased effort, breath sounds clear  Cardiovascular:  Regular rhythm, S1, S2, no murmur/rub/S3/S4, no carotid/femoral/abdominal bruit, radial/pedal pulses 2+, no edema  Abdomen:  Soft, non-tender, non-distended, normoactive bowel sounds, no HSM  Extremities:  Gait & station:   Digits:   Nails:   Joints, Bones, Muscles:   ROM:   Stability:  Skin:  No rash/erythema/ecchymoses/petechiae/wounds/abscess/warm/dry  Musculoskeletal:  Full ROM in all joints w/o swelling/tenderness/effusion    NEUROLOGICAL EXAM:  HENT:  Normocephalic head; atraumatic head.  Neck supple.  ENT: normal looking.  Mental State:    sleepy but arousable ,speech is slurred ,no hallucination,oriented to self only ,cognitive function is impaired     Cranial Nerves:  II-XII:   Pupils round and reactive to light and accommodation.  Extraocular movements full.  Visual fields full (no homonymous hemianopsia).  Visual acuity wnl.  Facial symmetry intact.  Tongue midline.  Motor Functions:  Moves all extremities.  No pronator drift of UE.  motor strength is 3/5   Sensory Functions:  unreliable    Reflexes:  Deep tendon reflexes normoactive to biceps, knees and ankles. plantar responses are flexor  Cerebellar Testing:    Finger to nose intact.  Nystagmus absent.  Gait : hesitant     LABS:                        14.0   10.90 )-----------( 272      ( 07 Mar 2019 07:53 )             45.0     03-07    138  |  102  |  9   ----------------------------<  145<H>  3.5   |  29  |  0.73    Ca    8.3<L>      07 Mar 2019 10:12  Phos  3.5     03-06  Mg     2.2     03-06    TPro  7.1  /  Alb  3.1<L>  /  TBili  0.3  /  DBili  .07  /  AST  20  /  ALT  32  /  AlkPhos  46  03-05            RADIOLOGY & ADDITIONAL STUDIES:    Complete Blood Count: AM Sched. Collection: 07-Mar-2019 05:00 (03-06 @ 13:25)  Basic Metabolic Panel: AM Sched. Collection: 07-Mar-2019 05:00  Cancel Reason: Hemolyzed Redraw (03-06 @ 13:25)  Complete Blood Count: AM Sched. Collection: 07-Mar-2019 05:00  Cancel Reason: Dup Cancel (03-06 @ 13:51)  Basic Metabolic Panel: AM Sched. Collection: 07-Mar-2019 05:00  Cancel Reason: Dup Cancel (03-06 @ 13:51)  Procalcitonin, Serum: AM Sched. Collection: 07-Mar-2019 05:00 (03-06 @ 13:51)  aluminum hydroxide/magnesium hydroxide/simethicone Suspension: [Ordered as MYLANTA]  30 milliLiter(s), Oral, once, Stop After 1 Doses  Administration Instructions: shake well  Provider's Contact #: [phone] (03-06 @ 17:49)  Basic Metabolic Panel: STAT (03-07 @ 09:04)      Assessment & Opinion:48 y o man with heroin and alcohol abuse was found unresponsive and had multiple episodes of seizure   DX Multiple substance abuse --seizure    Recommendations:  Brain MRI.   EEG.   DVT prophylaxis as ordered.  Medications:  Ativan

## 2019-03-07 NOTE — PROGRESS NOTE ADULT - SUBJECTIVE AND OBJECTIVE BOX
Patient is a 49y old  Male who presents with a chief complaint of fd unresponsive, incontinent, seizing (07 Mar 2019 11:46)      INTERVAL HPI/OVERNIGHT EVENTS:    MEDICATIONS  (STANDING):  enoxaparin Injectable 40 milliGRAM(s) SubCutaneous daily  folic acid 1 milliGRAM(s) Oral daily  LORazepam   Injectable 2 milliGRAM(s) IV Push every 4 hours  multivitamin 1 Tablet(s) Oral daily  nicotine - 21 mG/24Hr(s) Patch 1 patch Transdermal daily  thiamine 100 milliGRAM(s) Oral daily    MEDICATIONS  (PRN):  LORazepam   Injectable 2 milliGRAM(s) IV Push every 2 hours PRN if CIWA >8 and notify MD  simethicone 80 milliGRAM(s) Chew every 8 hours PRN Gas      Allergies    No Known Allergies    Intolerances          Vital Signs Last 24 Hrs  T(C): 36.6 (07 Mar 2019 17:18), Max: 36.8 (07 Mar 2019 12:14)  T(F): 97.8 (07 Mar 2019 17:18), Max: 98.3 (07 Mar 2019 12:14)  HR: 101 (07 Mar 2019 17:18) (95 - 101)  BP: 132/83 (07 Mar 2019 17:18) (110/77 - 151/87)  BP(mean): --  RR: 17 (07 Mar 2019 17:18) (16 - 18)  SpO2: 98% (07 Mar 2019 17:18) (97% - 98%)    PHYSICAL EXAM:  GENERAL:   HEAD:    EYES:   ENMT:   NECK:   NERVOUS SYSTEM:    CHEST/LUNG:   HEART:   ABDOMEN:   EXTREMITIES:    LYMPH:   SKIN:     LABS:                        14.0   10.90 )-----------( 272      ( 07 Mar 2019 07:53 )             45.0     03-07    138  |  102  |  9   ----------------------------<  145<H>  3.5   |  29  |  0.73    Ca    8.3<L>      07 Mar 2019 10:12  Phos  3.5     03-06  Mg     2.2     03-06          CAPILLARY BLOOD GLUCOSE          RADIOLOGY & ADDITIONAL TESTS:    Imaging Personally Reviewed:  [ ] YES  [ ] NO    Consultant(s) Notes Reviewed:  [ ] YES  [ ] NO    Care Discussed with Consultants/Other Providers [ ] YES  [ ] NO

## 2019-03-08 ENCOUNTER — TRANSCRIPTION ENCOUNTER (OUTPATIENT)
Age: 49
End: 2019-03-08

## 2019-03-08 VITALS
RESPIRATION RATE: 17 BRPM | HEART RATE: 89 BPM | TEMPERATURE: 98 F | SYSTOLIC BLOOD PRESSURE: 132 MMHG | OXYGEN SATURATION: 98 % | DIASTOLIC BLOOD PRESSURE: 89 MMHG

## 2019-03-08 LAB
ANION GAP SERPL CALC-SCNC: 7 MMOL/L — SIGNIFICANT CHANGE UP (ref 5–17)
BUN SERPL-MCNC: 12 MG/DL — SIGNIFICANT CHANGE UP (ref 7–23)
CALCIUM SERPL-MCNC: 8.7 MG/DL — SIGNIFICANT CHANGE UP (ref 8.5–10.1)
CHLORIDE SERPL-SCNC: 106 MMOL/L — SIGNIFICANT CHANGE UP (ref 96–108)
CO2 SERPL-SCNC: 27 MMOL/L — SIGNIFICANT CHANGE UP (ref 22–31)
CREAT SERPL-MCNC: 0.63 MG/DL — SIGNIFICANT CHANGE UP (ref 0.5–1.3)
GLUCOSE SERPL-MCNC: 100 MG/DL — HIGH (ref 70–99)
POTASSIUM SERPL-MCNC: 4.1 MMOL/L — SIGNIFICANT CHANGE UP (ref 3.5–5.3)
POTASSIUM SERPL-SCNC: 4.1 MMOL/L — SIGNIFICANT CHANGE UP (ref 3.5–5.3)
SODIUM SERPL-SCNC: 140 MMOL/L — SIGNIFICANT CHANGE UP (ref 135–145)

## 2019-03-08 PROCEDURE — 99239 HOSP IP/OBS DSCHRG MGMT >30: CPT

## 2019-03-08 RX ORDER — FOLIC ACID 0.8 MG
1 TABLET ORAL
Qty: 30 | Refills: 0 | OUTPATIENT
Start: 2019-03-08

## 2019-03-08 RX ORDER — THIAMINE MONONITRATE (VIT B1) 100 MG
1 TABLET ORAL
Qty: 30 | Refills: 0 | OUTPATIENT
Start: 2019-03-08

## 2019-03-08 RX ORDER — NICOTINE POLACRILEX 2 MG
1 GUM BUCCAL
Qty: 7 | Refills: 0 | OUTPATIENT
Start: 2019-03-08

## 2019-03-08 RX ADMIN — ENOXAPARIN SODIUM 40 MILLIGRAM(S): 100 INJECTION SUBCUTANEOUS at 12:02

## 2019-03-08 RX ADMIN — Medication 1 PATCH: at 09:08

## 2019-03-08 RX ADMIN — Medication 1 TABLET(S): at 12:02

## 2019-03-08 RX ADMIN — Medication 2 MILLIGRAM(S): at 05:45

## 2019-03-08 RX ADMIN — Medication 2 MILLIGRAM(S): at 10:04

## 2019-03-08 RX ADMIN — Medication 100 MILLIGRAM(S): at 12:09

## 2019-03-08 RX ADMIN — Medication 1 MILLIGRAM(S): at 12:09

## 2019-03-08 NOTE — DISCHARGE NOTE PROVIDER - NSDCCPCAREPLAN_GEN_ALL_CORE_FT
PRINCIPAL DISCHARGE DIAGNOSIS  Problem: Alcohol withdrawal seizure  Assessment and Plan of Treatment: STOP DRINKING.      SECONDARY DISCHARGE DIAGNOSES  Problem: Heroin abuse  Assessment and Plan of Treatment: STOP USING HEROIN    Problem: Tobacco dependence due to cigarettes  Assessment and Plan of Treatment: STOP SMOKING. nicotine patch prescribed

## 2019-03-08 NOTE — PROGRESS NOTE ADULT - SUBJECTIVE AND OBJECTIVE BOX
Subjective Complaints:  Historian: Patient himself         REVIEW OF SYSTEMS:  Eyes:  Good vision, no reported pain  ENT:  No sore throat, pain, runny nose, dysphagia  CV:  No pain, palpitatioins, hypo/hypertension  Resp:  No dyspnea, cough, tachypnea, wheezing  GI:  No pain, nausea, vomiting, diarrhea, constipatiion  Muscle:  No pain, weakness  Neuro:  No weakness, tingling, memory problems  Psych:  No fatigue, insomnia, mood problems, depression  Endocrine:  No polyuria, polydypsia, cold/heat intolerance    Vital Signs Last 24 Hrs  T(C): 36.3 (08 Mar 2019 05:47), Max: 36.9 (08 Mar 2019 00:41)  T(F): 97.3 (08 Mar 2019 05:47), Max: 98.4 (08 Mar 2019 00:41)  HR: 95 (08 Mar 2019 05:47) (88 - 101)  BP: 141/99 (08 Mar 2019 05:47) (118/73 - 141/99)  BP(mean): --  RR: 18 (08 Mar 2019 05:47) (17 - 18)  SpO2: 97% (08 Mar 2019 05:47) (95% - 98%)    GENERAL PHYSICAL EXAM:  General:  Appears stated age, well-groomed, well-nourished, no distress  HEENT:  NC/AT, patent nares w/ pink mucosa, OP clear w/o lesions, PERRL, EOMI, conjunctivae clear, no thyromegaly, nodules, adenopathy, no JVD  Chest:  Full & symmetric excursion, no increased effort, breath sounds clear  Cardiovascular:  Regular rhythm, S1, S2, no murmur/rub/S3/S4, no carotid/femoral/abdominal bruit, radial/pedal pulses 2+, no edema  Abdomen:  Soft, non-tender, non-distended, normoactive bowel sounds, no HSM  Extremities:  Gait & station:   Digits:   Nails:   Joints, Bones, Muscles:   ROM:   Stability:  Skin:  No rash/erythema/ecchymoses/petechiae/wounds/abscess/warm/dry  Musculoskeletal:  Full ROM in all joints w/o swelling/tenderness/effusion    NEUROLOGICAL EXAM:  HENT:  Normocephalic head; atraumatic head.  Neck supple.  ENT: normal looking.  Mental State:    Alert.  Fully oriented to person, place and date.  Coherent.  Speech clear and intact.  Cooperative.  Responds appropriately.    Cranial Nerves:  II-XII:   Pupils round and reactive to light and accommodation.  Extraocular movements full.  Visual fields full (no homonymous hemianopsia).  Visual acuity wnl.  Facial symmetry intact.  Tongue midline.  Motor Functions:  Motor strength is 5/5  Sensory Functions:   Intact to touch and pinprick to face and extremities.    Reflexes:  Deep tendon reflexes normoactive to biceps, knees and ankles. platar responses are flexor   Cerebellar Testing:    Finger to nose intact.  Nystagmus absent.  Gait: unremarkable     LABS:                        14.0   10.90 )-----------( 272      ( 07 Mar 2019 07:53 )             45.0     03-08    140  |  106  |  12  ----------------------------<  100<H>  4.1   |  27  |  0.63    Ca    8.7      08 Mar 2019 06:42        DX Polysubstance abuse --seizure       RADIOLOGY & ADDITIONAL STUDIES:    Basic Metabolic Panel: AM Sched. Collection: 08-Mar-2019 05:00 (03-07 @ 20:53)    DX Polysubstance abuse   Recommendations:   EEG.   DVT prophylaxis as ordered.  Medications:  Ativan prn for seizure

## 2019-03-08 NOTE — DISCHARGE NOTE NURSING/CASE MANAGEMENT/SOCIAL WORK - NSDCPEEMAIL_GEN_ALL_CORE
[facility] for Tobacco Control email [email or web address]

## 2019-03-08 NOTE — DISCHARGE NOTE NURSING/CASE MANAGEMENT/SOCIAL WORK - NSDCPEWEB_GEN_ALL_CORE
[place] website --- [email or web address] for Tobacco [email or web address] website --- [email or web address]

## 2019-03-08 NOTE — DISCHARGE NOTE PROVIDER - HOSPITAL COURSE
47 y/o M with PMH of Alcohol Abuse, Heroin Abuse, HTN, HLD;  admitted for multiple seizures in setting of alcohol and drug abuse. was found unresponsive on bathroom floor, Pt received narcane in the field, in ed he was combative and with multiple seizures, was intubated for airway protection. Started on propofol and versed drip. Patient admitted to ICU, was extubated on 3/2, doing well on NC, precedex was d/c'd and phenobarb IVP standing and prn was started, transferred to floors 3/4/19. Now doing well off oxygen, phenobarb was changed to ativan.             ·  Problem: Seizure.  Plan:    no further seizures. likely 2/2 to alcohol and drug abuse        ·  Problem: Hypokalemia.  Plan: improved s/p supplementation.         ·  Problem: ETOH abuse.  Plan:     MVI, thiamine, folic acid.         ·  Problem: Heroin abuse.  Plan:     counseled and educated to stop drug abuse.         Problem: Essential hypertension. Plan: stable without medication. low sodium diet        ·  Problem: Dental anomaly.  Plan: hole in the upper palate --no discharge or foul smell noted, stated it happened after his dental implants were removed while hospitalized at [facility]. Does not know reason. F/U with [facility] or dentist.         ·  Problem: Tobacco dependence due to cigarettes.  Plan: Nicotine patch.

## 2019-03-08 NOTE — DISCHARGE NOTE NURSING/CASE MANAGEMENT/SOCIAL WORK - NSDCDPATPORTLINK_GEN_ALL_CORE
You can access the [facility] Patient Portal, offered by [facility], by registering with the following website: [email or web address]

## 2019-03-13 DIAGNOSIS — F10.231 ALCOHOL DEPENDENCE WITH WITHDRAWAL DELIRIUM: ICD-10-CM

## 2019-03-13 DIAGNOSIS — F19.10 OTHER PSYCHOACTIVE SUBSTANCE ABUSE, UNCOMPLICATED: ICD-10-CM

## 2019-03-13 DIAGNOSIS — R06.89 OTHER ABNORMALITIES OF BREATHING: ICD-10-CM

## 2019-03-13 DIAGNOSIS — F11.10 OPIOID ABUSE, UNCOMPLICATED: ICD-10-CM

## 2019-03-13 DIAGNOSIS — F17.210 NICOTINE DEPENDENCE, CIGARETTES, UNCOMPLICATED: ICD-10-CM
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
## 2019-03-13 DIAGNOSIS — I10 ESSENTIAL (PRIMARY) HYPERTENSION: ICD-10-CM

## 2019-03-13 DIAGNOSIS — D72.828 OTHER ELEVATED WHITE BLOOD CELL COUNT: ICD-10-CM

## 2019-03-13 DIAGNOSIS — E87.6 HYPOKALEMIA: ICD-10-CM

## 2019-03-13 DIAGNOSIS — K00.9 DISORDER OF TOOTH DEVELOPMENT, UNSPECIFIED: ICD-10-CM

## 2019-03-13 DIAGNOSIS — E78.5 HYPERLIPIDEMIA, UNSPECIFIED: ICD-10-CM

## 2019-03-13 DIAGNOSIS — J96.00 ACUTE RESPIRATORY FAILURE, UNSPECIFIED WHETHER WITH HYPOXIA OR HYPERCAPNIA: ICD-10-CM

## 2019-03-13 DIAGNOSIS — Z78.1 PHYSICAL RESTRAINT STATUS: ICD-10-CM

## 2019-03-22 NOTE — PATIENT PROFILE ADULT - TOBACCO USE
Current every day smoker Problem: Communication  Goal: The ability to communicate needs accurately and effectively will improve  Patient able to communicate needs to staff. Call light within reach, patient educated to call for assistance. Patient calls appropriately.     Problem: Safety  Goal: Will remain free from injury  Outcome: PROGRESSING AS EXPECTED  Patient educated to call for assistance. Precautions in place; Call light within reach. Bed alarm in use.  Bed locked and in lowest position. Treaded socks in place. Hourly rounding.

## 2019-04-12 PROBLEM — I10 ESSENTIAL (PRIMARY) HYPERTENSION: Chronic | Status: ACTIVE | Noted: 2019-03-02

## 2019-04-12 PROBLEM — F19.10 OTHER PSYCHOACTIVE SUBSTANCE ABUSE, UNCOMPLICATED: Chronic | Status: ACTIVE | Noted: 2019-03-02

## 2019-04-12 PROBLEM — F10.10 ALCOHOL ABUSE, UNCOMPLICATED: Chronic | Status: ACTIVE | Noted: 2019-03-02

## 2019-04-12 PROBLEM — E78.00 PURE HYPERCHOLESTEROLEMIA, UNSPECIFIED: Chronic | Status: ACTIVE | Noted: 2019-03-02

## 2019-04-12 PROBLEM — F11.10 OPIOID ABUSE, UNCOMPLICATED: Chronic | Status: ACTIVE | Noted: 2019-03-02

## 2019-04-22 ENCOUNTER — APPOINTMENT (OUTPATIENT)
Dept: FAMILY MEDICINE | Facility: CLINIC | Age: 49
End: 2019-04-22
Payer: MEDICAID

## 2019-04-22 VITALS
HEIGHT: 66 IN | SYSTOLIC BLOOD PRESSURE: 140 MMHG | BODY MASS INDEX: 27.32 KG/M2 | WEIGHT: 170 LBS | RESPIRATION RATE: 18 BRPM | OXYGEN SATURATION: 97 % | DIASTOLIC BLOOD PRESSURE: 84 MMHG | HEART RATE: 78 BPM

## 2019-04-22 PROCEDURE — 99213 OFFICE O/P EST LOW 20 MIN: CPT

## 2019-06-04 ENCOUNTER — MEDICATION RENEWAL (OUTPATIENT)
Age: 49
End: 2019-06-04

## 2019-11-25 ENCOUNTER — RX RENEWAL (OUTPATIENT)
Age: 49
End: 2019-11-25

## 2019-12-30 ENCOUNTER — OUTPATIENT (OUTPATIENT)
Dept: OUTPATIENT SERVICES | Facility: HOSPITAL | Age: 49
LOS: 1 days | End: 2019-12-30
Payer: MEDICAID

## 2019-12-30 ENCOUNTER — APPOINTMENT (OUTPATIENT)
Dept: CT IMAGING | Facility: IMAGING CENTER | Age: 49
End: 2019-12-30
Payer: MEDICAID

## 2019-12-30 DIAGNOSIS — S79.102A: Chronic | ICD-10-CM

## 2019-12-30 DIAGNOSIS — Z00.8 ENCOUNTER FOR OTHER GENERAL EXAMINATION: ICD-10-CM

## 2019-12-30 DIAGNOSIS — J34.89 OTHER SPECIFIED DISORDERS OF NOSE AND NASAL SINUSES: ICD-10-CM

## 2019-12-30 PROCEDURE — 70486 CT MAXILLOFACIAL W/O DYE: CPT | Mod: 26

## 2019-12-30 PROCEDURE — 70486 CT MAXILLOFACIAL W/O DYE: CPT

## 2020-01-29 ENCOUNTER — APPOINTMENT (OUTPATIENT)
Dept: FAMILY MEDICINE | Facility: CLINIC | Age: 50
End: 2020-01-29
Payer: MEDICAID

## 2020-01-29 VITALS
DIASTOLIC BLOOD PRESSURE: 100 MMHG | SYSTOLIC BLOOD PRESSURE: 150 MMHG | WEIGHT: 175 LBS | OXYGEN SATURATION: 97 % | HEIGHT: 66 IN | HEART RATE: 83 BPM | TEMPERATURE: 98 F | BODY MASS INDEX: 28.12 KG/M2

## 2020-01-29 DIAGNOSIS — R03.0 ELEVATED BLOOD-PRESSURE READING, W/OUT DIAGNOSIS OF HYPERTENSION: ICD-10-CM

## 2020-01-29 DIAGNOSIS — J06.9 ACUTE UPPER RESPIRATORY INFECTION, UNSPECIFIED: ICD-10-CM

## 2020-01-29 DIAGNOSIS — K08.9 DISORDER OF TEETH AND SUPPORTING STRUCTURES, UNSPECIFIED: ICD-10-CM

## 2020-01-29 DIAGNOSIS — R05 COUGH: ICD-10-CM

## 2020-01-29 DIAGNOSIS — G47.00 INSOMNIA, UNSPECIFIED: ICD-10-CM

## 2020-01-29 DIAGNOSIS — J31.0 CHRONIC RHINITIS: ICD-10-CM

## 2020-01-29 DIAGNOSIS — I10 ESSENTIAL (PRIMARY) HYPERTENSION: ICD-10-CM

## 2020-01-29 DIAGNOSIS — Z00.00 ENCOUNTER FOR GENERAL ADULT MEDICAL EXAMINATION W/OUT ABNORMAL FINDINGS: ICD-10-CM

## 2020-01-29 DIAGNOSIS — K06.8 OTHER SPECIFIED DISORDERS OF GINGIVA AND EDENTULOUS ALVEOLAR RIDGE: ICD-10-CM

## 2020-01-29 DIAGNOSIS — Z76.0 ENCOUNTER FOR ISSUE OF REPEAT PRESCRIPTION: ICD-10-CM

## 2020-01-29 DIAGNOSIS — B00.9 HERPESVIRAL INFECTION, UNSPECIFIED: ICD-10-CM

## 2020-01-29 PROCEDURE — 99214 OFFICE O/P EST MOD 30 MIN: CPT

## 2020-01-29 RX ORDER — AMOXICILLIN 500 MG/1
500 CAPSULE ORAL 3 TIMES DAILY
Qty: 21 | Refills: 0 | Status: DISCONTINUED | COMMUNITY
Start: 2019-02-04 | End: 2020-01-29

## 2020-01-29 RX ORDER — IRBESARTAN 150 MG/1
150 TABLET ORAL DAILY
Qty: 90 | Refills: 0 | Status: DISCONTINUED | COMMUNITY
Start: 2019-02-05 | End: 2020-01-29

## 2020-01-29 RX ORDER — OLMESARTAN MEDOXOMIL 20 MG/1
20 TABLET, FILM COATED ORAL
Qty: 30 | Refills: 0 | Status: DISCONTINUED | COMMUNITY
Start: 2019-02-04 | End: 2020-01-29

## 2020-01-29 RX ORDER — NIZATIDINE 150 MG/1
150 CAPSULE ORAL
Qty: 60 | Refills: 0 | Status: DISCONTINUED | COMMUNITY
Start: 2018-12-20 | End: 2020-01-29

## 2020-01-29 RX ORDER — TRAZODONE HYDROCHLORIDE 100 MG/1
100 TABLET ORAL
Qty: 30 | Refills: 0 | Status: DISCONTINUED | COMMUNITY
Start: 2019-04-22 | End: 2020-01-29

## 2020-01-29 RX ORDER — LORATADINE 10 MG/1
10 TABLET ORAL
Qty: 30 | Refills: 3 | Status: DISCONTINUED | COMMUNITY
Start: 2019-02-04 | End: 2020-01-29

## 2020-01-29 RX ORDER — AZELASTINE HYDROCHLORIDE 137 UG/1
0.1 SPRAY, METERED NASAL
Qty: 1 | Refills: 0 | Status: DISCONTINUED | COMMUNITY
Start: 2019-02-04 | End: 2020-01-29

## 2020-01-29 RX ORDER — IBUPROFEN 600 MG/1
600 TABLET, FILM COATED ORAL 3 TIMES DAILY
Qty: 90 | Refills: 0 | Status: DISCONTINUED | COMMUNITY
Start: 2018-12-20 | End: 2020-01-29

## 2020-01-29 NOTE — PLAN
[FreeTextEntry1] : Explained risks of long term high bp and how meds can help, pt not interested at this time, but will consider cardio f/u

## 2020-01-29 NOTE — HISTORY OF PRESENT ILLNESS
[FreeTextEntry1] : c/o trouble sleeping, states trazadone doesn't help, has tried ambien in the past by other provider, has not followed up w/ psych\par needs valtrex refills [de-identified] : here for med refills\par still smoking, not really interested in quitting, will not take meds\par also states bp always runs 140/80 or so, stopped previous bp meds since he "didn't feel well on them"\par seeing dentist \par

## 2020-01-29 NOTE — PHYSICAL EXAM
[No Acute Distress] : no acute distress [Well Nourished] : well nourished [Normal Sclera/Conjunctiva] : normal sclera/conjunctiva [EOMI] : extraocular movements intact [No JVD] : no jugular venous distention [Normal Outer Ear/Nose] : the outer ears and nose were normal in appearance [Clear to Auscultation] : lungs were clear to auscultation bilaterally [No Respiratory Distress] : no respiratory distress  [No Accessory Muscle Use] : no accessory muscle use [Regular Rhythm] : with a regular rhythm [Normal Rate] : normal rate  [Coordination Grossly Intact] : coordination grossly intact [Normal Affect] : the affect was normal [Normal Insight/Judgement] : insight and judgment were intact [Alert and Oriented x3] : oriented to person, place, and time [de-identified] : smells likes cigarettes [de-identified] : diminished [de-identified] : poor and absent dentition

## 2020-03-21 ENCOUNTER — RX RENEWAL (OUTPATIENT)
Age: 50
End: 2020-03-21

## 2020-03-23 ENCOUNTER — RX RENEWAL (OUTPATIENT)
Age: 50
End: 2020-03-23

## 2020-06-29 ENCOUNTER — RX RENEWAL (OUTPATIENT)
Age: 50
End: 2020-06-29

## 2020-08-08 ENCOUNTER — EMERGENCY (EMERGENCY)
Facility: HOSPITAL | Age: 50
LOS: 0 days | Discharge: LEFT BEFORE DISCHARGE | End: 2020-08-08
Attending: EMERGENCY MEDICINE
Payer: MEDICAID

## 2020-08-08 VITALS
WEIGHT: 169.98 LBS | OXYGEN SATURATION: 96 % | TEMPERATURE: 98 F | HEIGHT: 65 IN | DIASTOLIC BLOOD PRESSURE: 106 MMHG | HEART RATE: 90 BPM | RESPIRATION RATE: 18 BRPM | SYSTOLIC BLOOD PRESSURE: 175 MMHG

## 2020-08-08 VITALS — RESPIRATION RATE: 14 BRPM | HEART RATE: 88 BPM | OXYGEN SATURATION: 96 %

## 2020-08-08 DIAGNOSIS — I10 ESSENTIAL (PRIMARY) HYPERTENSION: ICD-10-CM

## 2020-08-08 DIAGNOSIS — F19.10 OTHER PSYCHOACTIVE SUBSTANCE ABUSE, UNCOMPLICATED: ICD-10-CM

## 2020-08-08 DIAGNOSIS — S79.102A: Chronic | ICD-10-CM

## 2020-08-08 DIAGNOSIS — F10.10 ALCOHOL ABUSE, UNCOMPLICATED: ICD-10-CM

## 2020-08-08 DIAGNOSIS — E78.00 PURE HYPERCHOLESTEROLEMIA, UNSPECIFIED: ICD-10-CM

## 2020-08-08 LAB
ANION GAP SERPL CALC-SCNC: 9 MMOL/L — SIGNIFICANT CHANGE UP (ref 5–17)
BASOPHILS # BLD AUTO: 0.09 K/UL — SIGNIFICANT CHANGE UP (ref 0–0.2)
BASOPHILS NFR BLD AUTO: 0.4 % — SIGNIFICANT CHANGE UP (ref 0–2)
BUN SERPL-MCNC: 16 MG/DL — SIGNIFICANT CHANGE UP (ref 7–23)
CALCIUM SERPL-MCNC: 8.5 MG/DL — SIGNIFICANT CHANGE UP (ref 8.5–10.1)
CHLORIDE SERPL-SCNC: 105 MMOL/L — SIGNIFICANT CHANGE UP (ref 96–108)
CO2 SERPL-SCNC: 23 MMOL/L — SIGNIFICANT CHANGE UP (ref 22–31)
CREAT SERPL-MCNC: 0.8 MG/DL — SIGNIFICANT CHANGE UP (ref 0.5–1.3)
EOSINOPHIL # BLD AUTO: 0.02 K/UL — SIGNIFICANT CHANGE UP (ref 0–0.5)
EOSINOPHIL NFR BLD AUTO: 0.1 % — SIGNIFICANT CHANGE UP (ref 0–6)
ETHANOL SERPL-MCNC: <10 MG/DL — SIGNIFICANT CHANGE UP (ref 0–10)
GLUCOSE BLDC GLUCOMTR-MCNC: 132 MG/DL — HIGH (ref 70–99)
GLUCOSE SERPL-MCNC: 103 MG/DL — HIGH (ref 70–99)
HCT VFR BLD CALC: 42 % — SIGNIFICANT CHANGE UP (ref 39–50)
HGB BLD-MCNC: 14 G/DL — SIGNIFICANT CHANGE UP (ref 13–17)
IMM GRANULOCYTES NFR BLD AUTO: 1.3 % — SIGNIFICANT CHANGE UP (ref 0–1.5)
LYMPHOCYTES # BLD AUTO: 1.82 K/UL — SIGNIFICANT CHANGE UP (ref 1–3.3)
LYMPHOCYTES # BLD AUTO: 8 % — LOW (ref 13–44)
MCHC RBC-ENTMCNC: 30.1 PG — SIGNIFICANT CHANGE UP (ref 27–34)
MCHC RBC-ENTMCNC: 33.3 GM/DL — SIGNIFICANT CHANGE UP (ref 32–36)
MCV RBC AUTO: 90.3 FL — SIGNIFICANT CHANGE UP (ref 80–100)
MONOCYTES # BLD AUTO: 1.31 K/UL — HIGH (ref 0–0.9)
MONOCYTES NFR BLD AUTO: 5.8 % — SIGNIFICANT CHANGE UP (ref 2–14)
NEUTROPHILS # BLD AUTO: 19.1 K/UL — HIGH (ref 1.8–7.4)
NEUTROPHILS NFR BLD AUTO: 84.4 % — HIGH (ref 43–77)
NRBC # BLD: 0 /100 WBCS — SIGNIFICANT CHANGE UP (ref 0–0)
PLATELET # BLD AUTO: 338 K/UL — SIGNIFICANT CHANGE UP (ref 150–400)
POTASSIUM SERPL-MCNC: 3.9 MMOL/L — SIGNIFICANT CHANGE UP (ref 3.5–5.3)
POTASSIUM SERPL-SCNC: 3.9 MMOL/L — SIGNIFICANT CHANGE UP (ref 3.5–5.3)
RBC # BLD: 4.65 M/UL — SIGNIFICANT CHANGE UP (ref 4.2–5.8)
RBC # FLD: 13 % — SIGNIFICANT CHANGE UP (ref 10.3–14.5)
SARS-COV-2 RNA SPEC QL NAA+PROBE: SIGNIFICANT CHANGE UP
SODIUM SERPL-SCNC: 137 MMOL/L — SIGNIFICANT CHANGE UP (ref 135–145)
WBC # BLD: 22.64 K/UL — HIGH (ref 3.8–10.5)
WBC # FLD AUTO: 22.64 K/UL — HIGH (ref 3.8–10.5)

## 2020-08-08 PROCEDURE — 93010 ELECTROCARDIOGRAM REPORT: CPT

## 2020-08-08 PROCEDURE — 71045 X-RAY EXAM CHEST 1 VIEW: CPT | Mod: 26

## 2020-08-08 PROCEDURE — 99284 EMERGENCY DEPT VISIT MOD MDM: CPT

## 2020-08-08 RX ORDER — SODIUM CHLORIDE 9 MG/ML
1000 INJECTION INTRAMUSCULAR; INTRAVENOUS; SUBCUTANEOUS ONCE
Refills: 0 | Status: COMPLETED | OUTPATIENT
Start: 2020-08-08 | End: 2020-08-08

## 2020-08-08 RX ORDER — NALOXONE HYDROCHLORIDE 4 MG/.1ML
0.4 SPRAY NASAL ONCE
Refills: 0 | Status: COMPLETED | OUTPATIENT
Start: 2020-08-08 | End: 2020-08-08

## 2020-08-08 RX ADMIN — NALOXONE HYDROCHLORIDE 0.4 MILLIGRAM(S): 4 SPRAY NASAL at 22:06

## 2020-08-08 RX ADMIN — SODIUM CHLORIDE 1000 MILLILITER(S): 9 INJECTION INTRAMUSCULAR; INTRAVENOUS; SUBCUTANEOUS at 20:10

## 2020-08-08 RX ADMIN — SODIUM CHLORIDE 1000 MILLILITER(S): 9 INJECTION INTRAMUSCULAR; INTRAVENOUS; SUBCUTANEOUS at 23:01
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
## 2020-08-08 NOTE — ED PROVIDER NOTE - PATIENT PORTAL LINK FT
You can access the FollowMyHealth Patient Portal offered by [facility] by registering at the following website: [email or web address]. By joining [employer]’s FollowMyHealth portal, you will also be able to view your health information using other applications (apps) compatible with our system.

## 2020-08-08 NOTE — ED PROVIDER NOTE - PMH
Drug abuse    Essential hypertension    ETOH abuse    Heroin abuse    Pure hypercholesterolemia    Substance abuse

## 2020-08-08 NOTE — ED PROVIDER NOTE - PROGRESS NOTE DETAILS
Pt became more sleepy, RR went down, woke up with narcan, pt vomited 1 time. Patient is aaox3 and is asking to leave. The patient does not want to be admitted and understands the risk of leaving including permanent disability and DEATH. Risk, benefit, hazards, alternatives and precautions were reviewed and the pt voices understanding. He is aware that she may return at anytime and that close follow up with primary care doctor is recommended as soon as possible. He voices understanding. Will sign AMA

## 2020-08-08 NOTE — ED ADULT NURSE NOTE - OBJECTIVE STATEMENT
Pt presents to ED via EMS s/p AMS episode, received 1mg of narcan IN. Pt reluctant to answer questions about HPI, states his right side of his teeth/jaw feels numb but hurts- he has a known septal problem and a "hole" in his mouth. Denies using opiate based medications or drugs, states he treats the pain with motrin only. Admits to EtOH and marijuana use today. IV was in place by EMS in r ac. RSR on monitor, no ectopy. Fingertips noted to have brown stains and some clubbing. respirations even and unlabored. will continue to monitor awaiting orders.

## 2020-08-08 NOTE — ED ADULT NURSE REASSESSMENT NOTE - NS ED NURSE REASSESS COMMENT FT1
[employer] alerted this RN that pt's respiratory rate decreased to 7/min. Dr. Guerrero aware, 0.4mg narcan given slow IVP, with spontaneous increase in respiratory effort within a few minutes. Pt woke up, vomited x 1, and remained alert.
Pt found to be sleeping on stretcher, semi fowlers, with slowed respirations of 8-10/min SpO2 86%, notified Dr. Guerrero who ordered narcan. Upon returning to patient, pt woke up and began breathing adequately with increase in respiratory rate as well as depth, increasing to 14/min and 92%. Blood drawn via butterfly and COVID nasal swab sent. vitals as noted. will continue to monitor with narcan on standby
Pt states he is fine and wants to leave. Educated pt about the importance of remaining in ED for his safety due to episodes of hypoventilation. Pt states he wants to go home and is okay. Notified Dr. Guerrero
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
## 2020-08-08 NOTE — ED ADULT NURSE NOTE - SCORE
----- Message from Elsie Aden sent at 7/17/2018 10:16 AM CDT -----  Contact: Self   Pt requesting doctor's note for missing work. [phone]    
Patient notified excuse ready for pick-up.  
7

## 2020-08-08 NOTE — ED ADULT TRIAGE NOTE - CHIEF COMPLAINT QUOTE
BIBA,  heroine overdose.  Narcan 1mg IV given.  pt aaox3. pt denies heroine use, states he had about 5 beers and smoked some pot.   #20guage R-AC.  per EMS,  pt sitting on stoop, snoring resp, EMS shrugged pt, awoke,

## 2020-12-01 ENCOUNTER — APPOINTMENT (OUTPATIENT)
Dept: FAMILY MEDICINE | Facility: CLINIC | Age: 50
End: 2020-12-01
Payer: MEDICAID

## 2020-12-01 VITALS
DIASTOLIC BLOOD PRESSURE: 80 MMHG | HEART RATE: 80 BPM | HEIGHT: 66 IN | OXYGEN SATURATION: 95 % | WEIGHT: 182 LBS | SYSTOLIC BLOOD PRESSURE: 140 MMHG | BODY MASS INDEX: 29.25 KG/M2

## 2020-12-01 DIAGNOSIS — R36.1 HEMATOSPERMIA: ICD-10-CM

## 2020-12-01 PROCEDURE — 99072 ADDL SUPL MATRL&STAF TM PHE: CPT

## 2020-12-01 PROCEDURE — 99213 OFFICE O/P EST LOW 20 MIN: CPT

## 2020-12-01 RX ORDER — DOXYCYCLINE 100 MG/1
100 CAPSULE ORAL
Qty: 14 | Refills: 0 | Status: ACTIVE | COMMUNITY
Start: 2020-12-01 | End: 1900-01-01

## 2021-04-18 ENCOUNTER — RX RENEWAL (OUTPATIENT)
Age: 51
End: 2021-04-18

## 2021-05-16 ENCOUNTER — RX RENEWAL (OUTPATIENT)
Age: 51
End: 2021-05-16

## 2021-05-16 RX ORDER — DOXEPIN HYDROCHLORIDE 75 MG/1
75 CAPSULE ORAL
Qty: 30 | Refills: 0 | Status: ACTIVE | COMMUNITY
Start: 2020-01-29 | End: 1900-01-01

## 2021-05-20 ENCOUNTER — RX RENEWAL (OUTPATIENT)
Age: 51
End: 2021-05-20

## 2021-09-17 NOTE — CHART NOTE - NSCHARTNOTEFT_GEN_A_CORE
Patient ok to discharge per MD orders. Patient was transferred to [facility]. All paperwork was 
completed and report was given to nurse Michael MUJICA. [facility] with two staff and nurse assisted via 
gurney into ambulance. Patient was continued on TPN 70ml/hr and NS 1/2 D5 @100ml/hr. Nurse 
transported patient with [facility] to [facility]. Paperwork completed and sent. All patient belongings 
were bagged and sent with patient. Tele was removed and returned. Midline and PICC intact 
and sent to [facility]. Patient with implants, which are pulled out, for unknown reasons, needs dental evaluations    3 different dental offices were called, however none does inpatient evaluations.    Patient will need outpatient dental check up

## 2021-10-27 NOTE — H&P ADULT - PROBLEM SELECTOR PROBLEM 1
[de-identified] : Ms. ERNESTO PARKER is a 79 year female who presents to office complaining of right hip pain.\par She describes an intermittent posterior hip pain that is dull/achy in nature x 3 months with insidious onset.\par Denies specific injury, trauma, or fall.\par Pain is also present in her right groin at times.\par Pain is worsened with prolonged ambulation and going up and down stairs.\par Pain is relieved with rest and Aleve PRN pain.\par Although pain sometimes radiates down her right leg to her foot, she does not report any numbness/tingling/weakness of the leg.\par Patient is s/p left TKR 2013 and right TKR 2014 which are doing very well at this time.\par All review of systems, family history, social history, surgical history, past medical history, medications, and allergies not previously stated as positive are negative. They were reviewed by me today with the patient and documented accordingly.
Seizure

## 2021-12-05 ENCOUNTER — TRANSCRIPTION ENCOUNTER (OUTPATIENT)
Age: 51
End: 2021-12-05

## 2021-12-06 ENCOUNTER — RX RENEWAL (OUTPATIENT)
Age: 51
End: 2021-12-06

## 2022-01-04 ENCOUNTER — APPOINTMENT (OUTPATIENT)
Dept: FAMILY MEDICINE | Facility: CLINIC | Age: 52
End: 2022-01-04
Payer: MEDICAID

## 2022-01-04 VITALS
HEART RATE: 68 BPM | HEIGHT: 66 IN | WEIGHT: 165 LBS | SYSTOLIC BLOOD PRESSURE: 130 MMHG | DIASTOLIC BLOOD PRESSURE: 70 MMHG | OXYGEN SATURATION: 93 % | BODY MASS INDEX: 26.52 KG/M2 | TEMPERATURE: 98.3 F

## 2022-01-04 DIAGNOSIS — I10 ESSENTIAL (PRIMARY) HYPERTENSION: ICD-10-CM

## 2022-01-04 DIAGNOSIS — F17.210 NICOTINE DEPENDENCE, CIGARETTES, UNCOMPLICATED: ICD-10-CM

## 2022-01-04 PROCEDURE — 99214 OFFICE O/P EST MOD 30 MIN: CPT | Mod: 25

## 2022-01-04 RX ORDER — HYDROCORTISONE 1 %
12 CREAM (GRAM) TOPICAL TWICE DAILY
Qty: 2 | Refills: 8 | Status: ACTIVE | COMMUNITY
Start: 2022-01-04 | End: 1900-01-01

## 2022-01-04 RX ORDER — AMOXICILLIN 500 MG/1
500 TABLET, FILM COATED ORAL
Qty: 30 | Refills: 0 | Status: ACTIVE | COMMUNITY
Start: 2022-01-04 | End: 1900-01-01

## 2022-01-05 LAB
ALBUMIN SERPL ELPH-MCNC: 4.4 G/DL
ALP BLD-CCNC: 57 U/L
ALT SERPL-CCNC: 19 U/L
ANION GAP SERPL CALC-SCNC: 15 MMOL/L
AST SERPL-CCNC: 27 U/L
BASOPHILS # BLD AUTO: 0.05 K/UL
BASOPHILS NFR BLD AUTO: 0.6 %
BILIRUB SERPL-MCNC: <0.2 MG/DL
BUN SERPL-MCNC: 15 MG/DL
CALCIUM SERPL-MCNC: 9.2 MG/DL
CHLORIDE SERPL-SCNC: 102 MMOL/L
CHOLEST SERPL-MCNC: 173 MG/DL
CO2 SERPL-SCNC: 26 MMOL/L
COVID-19 NUCLEOCAPSID  GAM ANTIBODY INTERPRETATION: NEGATIVE
CREAT SERPL-MCNC: 0.84 MG/DL
EOSINOPHIL # BLD AUTO: 0.21 K/UL
EOSINOPHIL NFR BLD AUTO: 2.4 %
ESTIMATED AVERAGE GLUCOSE: 117 MG/DL
GLUCOSE SERPL-MCNC: 105 MG/DL
HBA1C MFR BLD HPLC: 5.7 %
HCT VFR BLD CALC: 42 %
HDLC SERPL-MCNC: 85 MG/DL
HGB BLD-MCNC: 12.9 G/DL
IMM GRANULOCYTES NFR BLD AUTO: 0.7 %
LDLC SERPL CALC-MCNC: 69 MG/DL
LYMPHOCYTES # BLD AUTO: 3.41 K/UL
LYMPHOCYTES NFR BLD AUTO: 38.7 %
MAN DIFF?: NORMAL
MCHC RBC-ENTMCNC: 30.3 PG
MCHC RBC-ENTMCNC: 30.7 GM/DL
MCV RBC AUTO: 98.6 FL
MONOCYTES # BLD AUTO: 0.81 K/UL
MONOCYTES NFR BLD AUTO: 9.2 %
NEUTROPHILS # BLD AUTO: 4.27 K/UL
NEUTROPHILS NFR BLD AUTO: 48.4 %
NONHDLC SERPL-MCNC: 88 MG/DL
PLATELET # BLD AUTO: 338 K/UL
POTASSIUM SERPL-SCNC: 4.7 MMOL/L
PROT SERPL-MCNC: 6.3 G/DL
PSA SERPL-MCNC: 1.06 NG/ML
RBC # BLD: 4.26 M/UL
RBC # FLD: 14.6 %
SARS-COV-2 AB SERPL QL IA: 0.08 INDEX
SODIUM SERPL-SCNC: 143 MMOL/L
TRIGL SERPL-MCNC: 97 MG/DL
TSH SERPL-ACNC: 3 UIU/ML
WBC # FLD AUTO: 8.81 K/UL

## 2022-02-07 ENCOUNTER — RX RENEWAL (OUTPATIENT)
Age: 52
End: 2022-02-07

## 2022-02-12 ENCOUNTER — RX RENEWAL (OUTPATIENT)
Age: 52
End: 2022-02-12

## 2022-02-14 NOTE — DISCHARGE NOTE NURSING/CASE MANAGEMENT/SOCIAL WORK - NSDCPEHOTLINE_GEN_ALL_CORE
Detail Level: Zone
Detail Level: Generalized
[facility] Smokers Quitline [phone] ([phone])

## 2022-03-17 ENCOUNTER — TRANSCRIPTION ENCOUNTER (OUTPATIENT)
Age: 52
End: 2022-03-17

## 2022-03-17 RX ORDER — ZOLPIDEM TARTRATE 10 MG/1
10 TABLET ORAL
Qty: 30 | Refills: 0 | Status: ACTIVE | COMMUNITY
Start: 2022-01-04 | End: 1900-01-01

## 2022-05-09 ENCOUNTER — RX RENEWAL (OUTPATIENT)
Age: 52
End: 2022-05-09

## 2022-08-01 ENCOUNTER — TRANSCRIPTION ENCOUNTER (OUTPATIENT)
Age: 52
End: 2022-08-01

## 2022-08-04 ENCOUNTER — APPOINTMENT (OUTPATIENT)
Dept: FAMILY MEDICINE | Facility: CLINIC | Age: 52
End: 2022-08-04

## 2022-11-29 NOTE — H&P ADULT - NO PERTINENT FAMILY HISTORY
[FreeTextEntry1] : I, Mark Euceda, acted solely as scribe for Dr. Emerson Smith DO on this date 11/29/2022 11:40AM .\par \par All medical record entries made by the Scribe were at my, Dr. Emerson Smith DO direction and personally dictated by me on 11/29/2022 11:40AM. I have reviewed the chart and agree that the record accurately reflects my personal performance of the history, physical exam, assessment and plan. I have also personally directed, reviewed and agreed with the chart.
<<----- Click to add NO pertinent Family History

## 2023-03-14 ENCOUNTER — EMERGENCY (EMERGENCY)
Facility: HOSPITAL | Age: 53
LOS: 0 days | Discharge: HOME | End: 2023-03-14
Attending: EMERGENCY MEDICINE
Payer: MEDICAID

## 2023-03-14 VITALS
TEMPERATURE: 98 F | RESPIRATION RATE: 18 BRPM | WEIGHT: 139.99 LBS | HEIGHT: 66 IN | DIASTOLIC BLOOD PRESSURE: 90 MMHG | HEART RATE: 78 BPM | OXYGEN SATURATION: 99 % | SYSTOLIC BLOOD PRESSURE: 149 MMHG

## 2023-03-14 VITALS
OXYGEN SATURATION: 97 % | TEMPERATURE: 99 F | DIASTOLIC BLOOD PRESSURE: 75 MMHG | RESPIRATION RATE: 14 BRPM | SYSTOLIC BLOOD PRESSURE: 131 MMHG | HEART RATE: 66 BPM

## 2023-03-14 DIAGNOSIS — F11.23 OPIOID DEPENDENCE WITH WITHDRAWAL: ICD-10-CM

## 2023-03-14 DIAGNOSIS — R11.2 NAUSEA WITH VOMITING, UNSPECIFIED: ICD-10-CM

## 2023-03-14 DIAGNOSIS — I10 ESSENTIAL (PRIMARY) HYPERTENSION: ICD-10-CM

## 2023-03-14 DIAGNOSIS — E78.00 PURE HYPERCHOLESTEROLEMIA, UNSPECIFIED: ICD-10-CM

## 2023-03-14 DIAGNOSIS — R19.7 DIARRHEA, UNSPECIFIED: ICD-10-CM

## 2023-03-14 DIAGNOSIS — R10.9 UNSPECIFIED ABDOMINAL PAIN: ICD-10-CM

## 2023-03-14 DIAGNOSIS — R68.83 CHILLS (WITHOUT FEVER): ICD-10-CM

## 2023-03-14 DIAGNOSIS — S79.102A: Chronic | ICD-10-CM

## 2023-03-14 LAB
ALBUMIN SERPL ELPH-MCNC: 3.3 G/DL — SIGNIFICANT CHANGE UP (ref 3.3–5)
ALP SERPL-CCNC: 71 U/L — SIGNIFICANT CHANGE UP (ref 40–120)
ALT FLD-CCNC: 34 U/L — SIGNIFICANT CHANGE UP (ref 12–78)
AMPHET UR-MCNC: NEGATIVE — SIGNIFICANT CHANGE UP
ANION GAP SERPL CALC-SCNC: 6 MMOL/L — SIGNIFICANT CHANGE UP (ref 5–17)
AST SERPL-CCNC: 28 U/L — SIGNIFICANT CHANGE UP (ref 15–37)
BARBITURATES UR SCN-MCNC: NEGATIVE — SIGNIFICANT CHANGE UP
BENZODIAZ UR-MCNC: NEGATIVE — SIGNIFICANT CHANGE UP
BILIRUB SERPL-MCNC: 0.2 MG/DL — SIGNIFICANT CHANGE UP (ref 0.2–1.2)
BUN SERPL-MCNC: 20 MG/DL — SIGNIFICANT CHANGE UP (ref 7–23)
CALCIUM SERPL-MCNC: 9.6 MG/DL — SIGNIFICANT CHANGE UP (ref 8.5–10.1)
CHLORIDE SERPL-SCNC: 103 MMOL/L — SIGNIFICANT CHANGE UP (ref 96–108)
CO2 SERPL-SCNC: 26 MMOL/L — SIGNIFICANT CHANGE UP (ref 22–31)
COCAINE METAB.OTHER UR-MCNC: POSITIVE — SIGNIFICANT CHANGE UP
CREAT SERPL-MCNC: 0.73 MG/DL — SIGNIFICANT CHANGE UP (ref 0.5–1.3)
EGFR: 109 ML/MIN/1.73M2 — SIGNIFICANT CHANGE UP
ETHANOL SERPL-MCNC: <10 MG/DL — SIGNIFICANT CHANGE UP (ref 0–10)
GLUCOSE BLDC GLUCOMTR-MCNC: 139 MG/DL — HIGH (ref 70–99)
GLUCOSE SERPL-MCNC: 120 MG/DL — HIGH (ref 70–99)
LACTATE SERPL-SCNC: 1.8 MMOL/L — SIGNIFICANT CHANGE UP (ref 0.7–2)
LIDOCAIN IGE QN: 203 U/L — SIGNIFICANT CHANGE UP (ref 73–393)
METHADONE UR-MCNC: NEGATIVE — SIGNIFICANT CHANGE UP
OPIATES UR-MCNC: POSITIVE — SIGNIFICANT CHANGE UP
PCP SPEC-MCNC: SIGNIFICANT CHANGE UP
PCP UR-MCNC: NEGATIVE — SIGNIFICANT CHANGE UP
POTASSIUM SERPL-MCNC: 3.9 MMOL/L — SIGNIFICANT CHANGE UP (ref 3.5–5.3)
POTASSIUM SERPL-SCNC: 3.9 MMOL/L — SIGNIFICANT CHANGE UP (ref 3.5–5.3)
PROT SERPL-MCNC: 8.6 GM/DL — HIGH (ref 6–8.3)
SODIUM SERPL-SCNC: 135 MMOL/L — SIGNIFICANT CHANGE UP (ref 135–145)
THC UR QL: NEGATIVE — SIGNIFICANT CHANGE UP

## 2023-03-14 PROCEDURE — 99284 EMERGENCY DEPT VISIT MOD MDM: CPT

## 2023-03-14 RX ORDER — SODIUM CHLORIDE 9 MG/ML
1000 INJECTION INTRAMUSCULAR; INTRAVENOUS; SUBCUTANEOUS ONCE
Refills: 0 | Status: COMPLETED | OUTPATIENT
Start: 2023-03-14 | End: 2023-03-14

## 2023-03-14 RX ORDER — BUPRENORPHINE AND NALOXONE 2; .5 MG/1; MG/1
1 TABLET SUBLINGUAL
Qty: 10 | Refills: 0
Start: 2023-03-14 | End: 2023-03-18

## 2023-03-14 RX ORDER — BUPRENORPHINE AND NALOXONE 2; .5 MG/1; MG/1
1 TABLET SUBLINGUAL ONCE
Refills: 0 | Status: DISCONTINUED | OUTPATIENT
Start: 2023-03-14 | End: 2023-03-14

## 2023-03-14 RX ORDER — ONDANSETRON 8 MG/1
8 TABLET, FILM COATED ORAL ONCE
Refills: 0 | Status: COMPLETED | OUTPATIENT
Start: 2023-03-14 | End: 2023-03-14

## 2023-03-14 RX ADMIN — BUPRENORPHINE AND NALOXONE 1 FILM(S): 2; .5 TABLET SUBLINGUAL at 22:20

## 2023-03-14 RX ADMIN — SODIUM CHLORIDE 1000 MILLILITER(S): 9 INJECTION INTRAMUSCULAR; INTRAVENOUS; SUBCUTANEOUS at 20:36

## 2023-03-14 RX ADMIN — Medication 0.1 MILLIGRAM(S): at 20:24

## 2023-03-14 RX ADMIN — BUPRENORPHINE AND NALOXONE 1 FILM(S): 2; .5 TABLET SUBLINGUAL at 20:24

## 2023-03-14 RX ADMIN — ONDANSETRON 8 MILLIGRAM(S): 8 TABLET, FILM COATED ORAL at 20:53

## 2023-03-14 NOTE — ED ADULT NURSE NOTE - NSICDXPASTMEDICALHX_GEN_ALL_CORE_FT
PAST MEDICAL HISTORY:  Drug abuse     Essential hypertension     ETOH abuse     Heroin abuse     Pure hypercholesterolemia     Substance abuse

## 2023-03-14 NOTE — ED ADULT NURSE NOTE - NSIMPLEMENTINTERV_GEN_ALL_ED
Implemented All Fall with Harm Risk Interventions:  Supply to call system. Call bell, personal items and telephone within reach. Instruct patient to call for assistance. Room bathroom lighting operational. Non-slip footwear when patient is off stretcher. Physically safe environment: no spills, clutter or unnecessary equipment. Stretcher in lowest position, wheels locked, appropriate side rails in place. Provide visual cue, wrist band, yellow gown, etc. Monitor gait and stability. Monitor for mental status changes and reorient to person, place, and time. Review medications for side effects contributing to fall risk. Reinforce activity limits and safety measures with patient and family. Provide visual clues: red socks.
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
## 2023-03-14 NOTE — ED ADULT NURSE REASSESSMENT NOTE - NS ED NURSE REASSESS COMMENT FT1
Hand off received by Nichelle Warren. COWs score 5. Pt increased movement, can sit still but has trouble. Cardiac monitor in place, Breathing spontaneous and unlabored. Lower arms bilaterally has brusing and raised bumps. Admits to heroin use, no heroin use past 3 days per pt. Pt in NAD at this time

## 2023-03-14 NOTE — ED ADULT NURSE NOTE - CHIEF COMPLAINT QUOTE
patient BIBA c/o of abdominal pain and mouth pain , c/o of diarrhea, started yesterday  N/V last use of Heroin was 3 days ago ( markers noted bilateral arm ) denied chest pain no headache

## 2023-03-14 NOTE — ED ADULT NURSE NOTE - CAS EDN DISCHARGE ASSESSMENT
Low COWS score (2)/Alert and oriented to person, place and time/Patient baseline mental status/Awake

## 2023-03-14 NOTE — ED PROVIDER NOTE - OBJECTIVE STATEMENT
53 year old male with PMH of HTN, heroin abuse hx, HLD, otherwise denies other substance use recently complaining of shakes, feeling of restlessness, nausea vomiting and diarrhea - states last dose of heroin was 2 days ago and is going through withdrawal symptoms - feeling unwell due to this. No fever but noted some chills -symptoms similar to what he has been through in past with withdrawal symptoms.

## 2023-03-14 NOTE — ED PROVIDER NOTE - NSFOLLOWUPINSTRUCTIONS_ED_ALL_ED_FT
Opioid Withdrawal Treatment    A prescription pill bottle with an example of a pill.   Opioid withdrawal is a group of symptoms that can happen if you have been taking opioids and then stop taking them. Opioid withdrawal can also happen:  •When the dosage is decreased.      •After stopping a prescription opioid as directed. Physical dependence can develop after taking opioids regularly for just a few days.      •After taking a prescription opioid incorrectly, such as taking more than recommended or taking it for a different purpose (opioid misuse).      •Taking the opioid illegally and then stopping.      Opioid withdrawal is not usually life-threatening, but it can be very uncomfortable and severe. Opioid withdrawal treatment makes managing withdrawal easier.      What are the signs and symptoms of opioid withdrawal?    Symptoms of this condition can be both physical and mental. Mild physical symptoms include:  •Nausea.      •Muscle aches or spasms.      •Watery eyes and runny nose.      •Widening of the dark centers of the eyes (dilated pupils).      •Flushing and itching of the skin.      Moderate symptoms include:  •Fever and sweating.      •Stomach cramping and diarrhea.      •Vomiting.      •Increased blood pressure and fast pulse.      Mental symptoms include:  •Depression.      •Anxiety and nervousness.      •Restlessness, irritability, and severe mood swings.      •Trouble sleeping.      •Increased craving for drugs.        When do symptoms start and how long do they last?    When symptoms start and how long they last depend on the kind of opioid you have been taking.  •Short-acting opioids, such as heroin or oxycodone, work fast and then lose their effect quickly. Symptoms occur within hours of stopping or reducing the amount you take. The worst symptoms (peak withdrawal) occur in 2–3 days. Overall, symptoms should lessen in 7–10 days.      •Long-acting opioids, such as methadone, work for a longer period of time. Symptoms can occur within 1–3 days of stopping or reducing the amount you take. The worst symptoms occur in 3–8 days. Symptoms may continue for several weeks but will be milder.      •Medicines that block the effects of opioids, such as naloxone, can cause withdrawal symptoms that begin within minutes and last for about an hour. It is important to get medical help in this situation.        How is this treated?    Treatment for this condition depends on the type of opioids that are causing your symptoms. Treatment for opioid withdrawal usually happens:  •In an emergency department.      •In a clinic or drug treatment facility.      •At home.      •In a combination of different settings.      Medicines    •Opioid or opioid-like medicine. This is the most effective treatment. It is used to block withdrawal symptoms, and then the dose is lowered over time.      •If you have been taking prescription opioid medicine, your health care provider may lower the dose over several days.    •If you have opioid use disorder, your health care provider may give you the following medicines to block withdrawals:  •Methadone. It is given at a certain dose. The dose is then slowly lowered over 6–10 days.      •Buprenorphine. It is given at a certain dose. The dose is then lowered over 3–5 days. In some cases, it may be lowered very slowly over 30 days.      •Other medicines may also be used as a stand-alone treatment or in combination with other treatments. These include:  •Alpha-2 adrenergic agonists. These medicines block a brain chemical (norepinephrine) that causes some withdrawal symptoms. They can reduce and lessen the severity of symptoms but do not stop them. The medicines can cause side effects such as drowsiness, dizziness, and low blood pressure.      •Medicines to reduce anxiety, relieve muscle aches, promote sleep, and decrease digestive symptoms like nausea and diarrhea.        •In an emergency, medicines that block the effects of opioids may be used. These medicines are called opioid antagonists. Naloxone is an example of this medicine. Naloxone is given to restore a person's breathing if it has slowed down or stopped due to an opioid overdose.        Other treatments  Two people talking with a counselor.  •Counseling. This treatment is also called talk therapy. It is provided by treatment counselors and is used in addition to medicines.      •Support groups. These groups provide emotional support, advice, and guidance during recovery. They offer a safe environment in which you can talk to present and past opioid users and those who have gone through treatment.        Follow these instructions at home:    •Always check with your health care provider before starting any new medicines. Many of the medicines used to treat opioid withdrawal can have dangerous side effects and should only be taken as prescribed by your health care provider.      • Do not start using an opioid medicine again without talking to a health care provider. You may be at an increased risk for problems, including opioid overdose.      •If you have chronic pain, ask your health care provider about an intensive pain rehabilitation program or a referral to a chronic pain specialist. You may need to work with a pain specialist to come up with a treatment plan to help manage pain.      •Keep all follow-up visits. This is important.        Contact a health care provider if:    •You need help stopping use of an opioid medicine.      •You have been on long-term opioids and are planning to stop, and you would like to prepare for any withdrawal symptoms.      •You have been treated at home, but you are still having symptoms of withdrawal.      •You have been treated for withdrawal, but you have started using opioids again (relapsed).        Get help right away if:    •You have chest pain and shortness of breath.      •You are drowsy and have difficulty staying awake.      •You feel weak or dizzy, or feel like you are going to pass out.      •You have nausea or vomiting that does not go away, or you begin to vomit blood.      •You have diarrhea that does not stop, or you begin to feel weak and light-headed.      •You feel severely depressed and anxious.      •You feel you may be a harm to yourself or others.      These symptoms may represent a serious problem that is an emergency. Do not wait to see if the symptoms will go away. Get medical help right away. Call your local emergency services (292 in the [place] Do not drive yourself to the hospital.       If you ever feel like you may hurt yourself or others, or have thoughts about taking your own life, get help right away. Go to your nearest emergency department or:   • Call your local emergency services (282 in the [place]        • Call a suicide crisis helpline, such as the National Suicide Prevention Lifeline at 1-629.111.5516 or 706 in the [place] This is open 24 hours a day in the [place]        • Text the Crisis Text Line at 459601 (in the [place]         Summary    •Opioid withdrawal is a group of symptoms that can happen if you have been taking opioids and then stop taking them.      •Opioid withdrawal symptoms are treated using medicines, counseling, and support groups.      •The most effective treatment is to use an opioid or opioid-like drug to block withdrawal symptoms and gradually lower the dose over time.      This information is not intended to replace advice given to you by your health care provider. Make sure you discuss any questions you have with your health care provider.

## 2023-03-14 NOTE — ED PROVIDER NOTE - CLINICAL SUMMARY MEDICAL DECISION MAKING FREE TEXT BOX
pt with heroin withdrawal symptoms - otherwise complaining of 1 day of nausea/vomiting diarrhea and chills from not having heroin, offered suboxone and clonidine and given zofran - will assess for improvement and dc with meds and substance abuse clinics for follow up

## 2023-03-14 NOTE — ED ADULT NURSE NOTE - OBJECTIVE STATEMENT
patient alert and oriented x4, BIBA for abdominal pain. pt actively nauseous and vomiting, complains of abdominal pain and keeps shouting "I need a sedative". pt is active heroin user, last dose was a few days ago with active marks on both arms from needle usage. pt very agitated and irritable. pt placed on continuous cardiac monitor and pulse ox. complaints of difficulty breathing with pulse ox of 97% on room air.

## 2023-03-14 NOTE — ED PROVIDER NOTE - PATIENT PORTAL LINK FT
You can access the FollowMyHealth Patient Portal offered by [facility] by registering at the following website: [email or web address]. By joining "[email or web address]’s FollowMyHealth portal, you will also be able to view your health information using other applications (apps) compatible with our system.

## 2023-03-18 ENCOUNTER — TRANSCRIPTION ENCOUNTER (OUTPATIENT)
Age: 53
End: 2023-03-18

## 2023-07-10 ENCOUNTER — APPOINTMENT (OUTPATIENT)
Dept: FAMILY MEDICINE | Facility: CLINIC | Age: 53
End: 2023-07-10

## 2023-07-14 ENCOUNTER — TRANSCRIPTION ENCOUNTER (OUTPATIENT)
Age: 53
End: 2023-07-14

## 2023-07-14 RX ORDER — ACYCLOVIR 50 MG/G
5 CREAM TOPICAL
Qty: 1 | Refills: 2 | Status: ACTIVE | COMMUNITY
Start: 2023-03-18 | End: 1900-01-01

## 2023-07-14 RX ORDER — VALACYCLOVIR 1 G/1
1 TABLET, FILM COATED ORAL
Qty: 12 | Refills: 1 | Status: ACTIVE | COMMUNITY
Start: 2018-12-20 | End: 1900-01-01

## 2023-12-06 NOTE — DIETITIAN INITIAL EVALUATION ADULT. - ORAL INTAKE PTA
Pt found up in room wandering asking were the bathroom is. IV was pulled out. Pt reoriented and settled back into bed. New IV placed.    good

## 2024-07-17 ENCOUNTER — APPOINTMENT (OUTPATIENT)
Dept: FAMILY MEDICINE | Facility: CLINIC | Age: 54
End: 2024-07-17